# Patient Record
Sex: MALE | Race: WHITE | NOT HISPANIC OR LATINO
[De-identification: names, ages, dates, MRNs, and addresses within clinical notes are randomized per-mention and may not be internally consistent; named-entity substitution may affect disease eponyms.]

---

## 2017-02-16 ENCOUNTER — APPOINTMENT (OUTPATIENT)
Dept: INTERNAL MEDICINE | Facility: CLINIC | Age: 67
End: 2017-02-16

## 2017-05-05 ENCOUNTER — APPOINTMENT (OUTPATIENT)
Dept: INTERNAL MEDICINE | Facility: CLINIC | Age: 67
End: 2017-05-05

## 2017-05-05 VITALS
HEIGHT: 69 IN | WEIGHT: 172 LBS | SYSTOLIC BLOOD PRESSURE: 220 MMHG | OXYGEN SATURATION: 98 % | HEART RATE: 100 BPM | BODY MASS INDEX: 25.48 KG/M2 | TEMPERATURE: 98.2 F | DIASTOLIC BLOOD PRESSURE: 90 MMHG

## 2017-05-05 VITALS — SYSTOLIC BLOOD PRESSURE: 150 MMHG | DIASTOLIC BLOOD PRESSURE: 80 MMHG

## 2017-06-06 ENCOUNTER — APPOINTMENT (OUTPATIENT)
Dept: INTERNAL MEDICINE | Facility: CLINIC | Age: 67
End: 2017-06-06

## 2017-06-06 VITALS
HEART RATE: 89 BPM | HEIGHT: 69 IN | WEIGHT: 170 LBS | DIASTOLIC BLOOD PRESSURE: 80 MMHG | OXYGEN SATURATION: 98 % | TEMPERATURE: 98.5 F | BODY MASS INDEX: 25.18 KG/M2 | SYSTOLIC BLOOD PRESSURE: 156 MMHG

## 2017-07-12 ENCOUNTER — APPOINTMENT (OUTPATIENT)
Dept: OTOLARYNGOLOGY | Facility: CLINIC | Age: 67
End: 2017-07-12

## 2018-01-11 ENCOUNTER — TRANSCRIPTION ENCOUNTER (OUTPATIENT)
Age: 68
End: 2018-01-11

## 2018-06-12 ENCOUNTER — APPOINTMENT (OUTPATIENT)
Dept: INTERNAL MEDICINE | Facility: CLINIC | Age: 68
End: 2018-06-12
Payer: MEDICARE

## 2018-06-12 VITALS
WEIGHT: 170 LBS | SYSTOLIC BLOOD PRESSURE: 184 MMHG | BODY MASS INDEX: 25.18 KG/M2 | HEART RATE: 66 BPM | HEIGHT: 69 IN | TEMPERATURE: 98.5 F | DIASTOLIC BLOOD PRESSURE: 68 MMHG | OXYGEN SATURATION: 100 %

## 2018-06-12 VITALS — SYSTOLIC BLOOD PRESSURE: 130 MMHG | DIASTOLIC BLOOD PRESSURE: 80 MMHG

## 2018-06-12 PROCEDURE — 99213 OFFICE O/P EST LOW 20 MIN: CPT

## 2018-06-12 NOTE — PHYSICAL EXAM

## 2018-06-13 NOTE — HISTORY OF PRESENT ILLNESS
[FreeTextEntry1] : No chief complaint [de-identified] : No chief complaint: Urine noted with blood;  Needs follow up with urology

## 2018-06-13 NOTE — ASSESSMENT
[FreeTextEntry1] : Patient exam is within normal limits; Will refer patient to another urologist since his present urologist has changed location; Provably needs cysto gain. Will contact

## 2019-05-01 DIAGNOSIS — Z00.00 ENCOUNTER FOR GENERAL ADULT MEDICAL EXAMINATION W/OUT ABNORMAL FINDINGS: ICD-10-CM

## 2019-08-05 ENCOUNTER — APPOINTMENT (OUTPATIENT)
Dept: INTERNAL MEDICINE | Facility: CLINIC | Age: 69
End: 2019-08-05
Payer: MEDICARE

## 2019-08-05 VITALS
OXYGEN SATURATION: 100 % | DIASTOLIC BLOOD PRESSURE: 86 MMHG | SYSTOLIC BLOOD PRESSURE: 161 MMHG | HEIGHT: 69 IN | WEIGHT: 167.31 LBS | TEMPERATURE: 97.8 F | HEART RATE: 89 BPM | BODY MASS INDEX: 24.78 KG/M2

## 2019-08-05 PROCEDURE — 99213 OFFICE O/P EST LOW 20 MIN: CPT

## 2019-08-05 RX ORDER — TAMSULOSIN HCL 0.4 MG
0.4 CAPSULE ORAL
Refills: 0 | Status: ACTIVE | COMMUNITY

## 2019-08-05 NOTE — PHYSICAL EXAM
[No Acute Distress] : no acute distress [Well Developed] : well developed [Well Nourished] : well nourished [Well-Appearing] : well-appearing [Normal Sclera/Conjunctiva] : normal sclera/conjunctiva [PERRL] : pupils equal round and reactive to light [EOMI] : extraocular movements intact [Normal Outer Ear/Nose] : the outer ears and nose were normal in appearance [Normal Oropharynx] : the oropharynx was normal [No JVD] : no jugular venous distention [Supple] : supple [No Lymphadenopathy] : no lymphadenopathy [Thyroid Normal, No Nodules] : the thyroid was normal and there were no nodules present [No Respiratory Distress] : no respiratory distress  [Clear to Auscultation] : lungs were clear to auscultation bilaterally [No Accessory Muscle Use] : no accessory muscle use [Normal Rate] : normal rate  [Regular Rhythm] : with a regular rhythm [Normal S1, S2] : normal S1 and S2 [No Murmur] : no murmur heard [No Carotid Bruits] : no carotid bruits [No Abdominal Bruit] : a ~M bruit was not heard ~T in the abdomen [No Edema] : there was no peripheral edema [Pedal Pulses Present] : the pedal pulses are present [No Varicosities] : no varicosities [No Extremity Clubbing/Cyanosis] : no extremity clubbing/cyanosis [No Palpable Aorta] : no palpable aorta [Non Tender] : non-tender [Soft] : abdomen soft [No Masses] : no abdominal mass palpated [Non-distended] : non-distended [No HSM] : no HSM [Normal Posterior Cervical Nodes] : no posterior cervical lymphadenopathy [Normal Bowel Sounds] : normal bowel sounds [Normal Anterior Cervical Nodes] : no anterior cervical lymphadenopathy [No CVA Tenderness] : no CVA  tenderness [Grossly Normal Strength/Tone] : grossly normal strength/tone [No Joint Swelling] : no joint swelling [No Spinal Tenderness] : no spinal tenderness [No Rash] : no rash [Coordination Grossly Intact] : coordination grossly intact [No Focal Deficits] : no focal deficits [Deep Tendon Reflexes (DTR)] : deep tendon reflexes were 2+ and symmetric [Normal Gait] : normal gait [Normal Affect] : the affect was normal [Normal Insight/Judgement] : insight and judgment were intact

## 2020-08-19 ENCOUNTER — APPOINTMENT (OUTPATIENT)
Dept: INTERNAL MEDICINE | Facility: CLINIC | Age: 70
End: 2020-08-19

## 2020-09-08 ENCOUNTER — APPOINTMENT (OUTPATIENT)
Dept: INTERNAL MEDICINE | Facility: CLINIC | Age: 70
End: 2020-09-08
Payer: MEDICARE

## 2020-09-08 VITALS — DIASTOLIC BLOOD PRESSURE: 80 MMHG | SYSTOLIC BLOOD PRESSURE: 130 MMHG

## 2020-09-08 VITALS — SYSTOLIC BLOOD PRESSURE: 130 MMHG | DIASTOLIC BLOOD PRESSURE: 80 MMHG

## 2020-09-08 PROCEDURE — 90662 IIV NO PRSV INCREASED AG IM: CPT

## 2020-09-08 PROCEDURE — G0008: CPT

## 2020-09-08 PROCEDURE — 93000 ELECTROCARDIOGRAM COMPLETE: CPT

## 2020-09-08 PROCEDURE — 99213 OFFICE O/P EST LOW 20 MIN: CPT | Mod: 25

## 2020-09-10 NOTE — HISTORY OF PRESENT ILLNESS
[FreeTextEntry1] : Labs  noted;  [de-identified] : Urine excellent ; Only one time a night urine;

## 2020-09-10 NOTE — HISTORY OF PRESENT ILLNESS
[FreeTextEntry1] : Labs  noted;  [de-identified] : Urine excellent ; Only one time a night urine;

## 2020-11-02 ENCOUNTER — APPOINTMENT (OUTPATIENT)
Dept: DERMATOLOGY | Facility: CLINIC | Age: 70
End: 2020-11-02

## 2021-09-10 ENCOUNTER — APPOINTMENT (OUTPATIENT)
Dept: INTERNAL MEDICINE | Facility: CLINIC | Age: 71
End: 2021-09-10
Payer: MEDICARE

## 2021-09-10 VITALS
RESPIRATION RATE: 16 BRPM | DIASTOLIC BLOOD PRESSURE: 80 MMHG | SYSTOLIC BLOOD PRESSURE: 171 MMHG | BODY MASS INDEX: 24.88 KG/M2 | OXYGEN SATURATION: 98 % | HEIGHT: 69 IN | TEMPERATURE: 98.2 F | HEART RATE: 84 BPM | WEIGHT: 168 LBS

## 2021-09-10 VITALS — SYSTOLIC BLOOD PRESSURE: 130 MMHG | DIASTOLIC BLOOD PRESSURE: 80 MMHG

## 2021-09-10 DIAGNOSIS — M25.473 EFFUSION, UNSPECIFIED ANKLE: ICD-10-CM

## 2021-09-10 PROCEDURE — 99213 OFFICE O/P EST LOW 20 MIN: CPT

## 2021-10-15 ENCOUNTER — RX RENEWAL (OUTPATIENT)
Age: 71
End: 2021-10-15

## 2021-12-15 ENCOUNTER — RX CHANGE (OUTPATIENT)
Age: 71
End: 2021-12-15

## 2021-12-15 RX ORDER — AMLODIPINE BESYLATE 10 MG/1
10 TABLET ORAL DAILY
Qty: 30 | Refills: 5 | Status: DISCONTINUED | COMMUNITY
Start: 2017-05-05 | End: 2021-12-15

## 2021-12-15 RX ORDER — TAMSULOSIN HYDROCHLORIDE 0.4 MG/1
0.4 CAPSULE ORAL
Qty: 30 | Refills: 11 | Status: DISCONTINUED | COMMUNITY
Start: 2020-09-29 | End: 2021-12-15

## 2022-03-24 ENCOUNTER — APPOINTMENT (OUTPATIENT)
Dept: INTERNAL MEDICINE | Facility: CLINIC | Age: 72
End: 2022-03-24
Payer: MEDICARE

## 2022-03-24 VITALS
WEIGHT: 170 LBS | HEIGHT: 69 IN | SYSTOLIC BLOOD PRESSURE: 158 MMHG | HEART RATE: 87 BPM | OXYGEN SATURATION: 98 % | RESPIRATION RATE: 14 BRPM | TEMPERATURE: 98.7 F | DIASTOLIC BLOOD PRESSURE: 82 MMHG | BODY MASS INDEX: 25.18 KG/M2

## 2022-03-24 DIAGNOSIS — Z92.29 PERSONAL HISTORY OF OTHER DRUG THERAPY: ICD-10-CM

## 2022-03-24 DIAGNOSIS — R31.9 HEMATURIA, UNSPECIFIED: ICD-10-CM

## 2022-03-24 PROCEDURE — 99213 OFFICE O/P EST LOW 20 MIN: CPT

## 2022-03-24 NOTE — HISTORY OF PRESENT ILLNESS
[FreeTextEntry1] : no chief comlaint\par Chest film clear;\par [employer] follow up [de-identified] : Exercise in house\par Lipids increased\par Should be repeated

## 2022-06-23 ENCOUNTER — APPOINTMENT (OUTPATIENT)
Dept: INTERNAL MEDICINE | Facility: CLINIC | Age: 72
End: 2022-06-23
Payer: MEDICARE

## 2022-06-23 VITALS
SYSTOLIC BLOOD PRESSURE: 164 MMHG | TEMPERATURE: 97.8 F | DIASTOLIC BLOOD PRESSURE: 94 MMHG | OXYGEN SATURATION: 98 % | BODY MASS INDEX: 25.18 KG/M2 | HEART RATE: 107 BPM | WEIGHT: 170 LBS | HEIGHT: 69 IN

## 2022-06-23 PROCEDURE — 99213 OFFICE O/P EST LOW 20 MIN: CPT

## 2022-06-23 NOTE — HEALTH RISK ASSESSMENT
[Never] : Never [No] : No [No falls in past year] : Patient reported no falls in the past year [0] : 2) Feeling down, depressed, or hopeless: Not at all (0) [[id number]] : 0

## 2022-10-06 ENCOUNTER — APPOINTMENT (OUTPATIENT)
Dept: INTERNAL MEDICINE | Facility: CLINIC | Age: 72
End: 2022-10-06

## 2022-10-06 VITALS — DIASTOLIC BLOOD PRESSURE: 80 MMHG | SYSTOLIC BLOOD PRESSURE: 130 MMHG

## 2022-10-06 VITALS
DIASTOLIC BLOOD PRESSURE: 86 MMHG | HEIGHT: 69 IN | HEART RATE: 99 BPM | SYSTOLIC BLOOD PRESSURE: 155 MMHG | OXYGEN SATURATION: 98 % | WEIGHT: 170 LBS | BODY MASS INDEX: 25.18 KG/M2 | TEMPERATURE: 97.8 F

## 2022-10-06 DIAGNOSIS — N20.0 CALCULUS OF KIDNEY: ICD-10-CM

## 2022-10-06 DIAGNOSIS — K21.9 GASTRO-ESOPHAGEAL REFLUX DISEASE W/OUT ESOPHAGITIS: ICD-10-CM

## 2022-10-06 PROCEDURE — 99213 OFFICE O/P EST LOW 20 MIN: CPT

## 2022-10-06 NOTE — HEALTH RISK ASSESSMENT
[Never] : Never [No] : No [No falls in past year] : Patient reported no falls in the past year [0] : 2) Feeling down, depressed, or hopeless: Not at all (0) [[id number]] : 0

## 2022-10-08 ENCOUNTER — RX RENEWAL (OUTPATIENT)
Age: 72
End: 2022-10-08

## 2022-12-14 RX ORDER — NIRMATRELVIR AND RITONAVIR 300-100 MG
20 X 150 MG & KIT ORAL
Qty: 1 | Refills: 0 | Status: ACTIVE | COMMUNITY
Start: 2022-12-14 | End: 1900-01-01

## 2022-12-19 ENCOUNTER — RX RENEWAL (OUTPATIENT)
Age: 72
End: 2022-12-19

## 2022-12-20 ENCOUNTER — RX RENEWAL (OUTPATIENT)
Age: 72
End: 2022-12-20

## 2023-01-04 ENCOUNTER — RX RENEWAL (OUTPATIENT)
Age: 73
End: 2023-01-04

## 2023-01-05 ENCOUNTER — APPOINTMENT (OUTPATIENT)
Dept: INTERNAL MEDICINE | Facility: CLINIC | Age: 73
End: 2023-01-05

## 2023-01-05 RX ORDER — MECLIZINE HYDROCHLORIDE 12.5 MG/1
12.5 TABLET ORAL 3 TIMES DAILY
Qty: 30 | Refills: 1 | Status: COMPLETED | COMMUNITY
Start: 2017-12-12 | End: 2019-08-25

## 2023-01-05 RX ORDER — AZITHROMYCIN 250 MG/1
250 TABLET, FILM COATED ORAL
Qty: 6 | Refills: 1 | Status: COMPLETED | COMMUNITY
Start: 2022-10-13 | End: 2022-10-13

## 2023-03-02 ENCOUNTER — RX RENEWAL (OUTPATIENT)
Age: 73
End: 2023-03-02

## 2023-03-02 ENCOUNTER — APPOINTMENT (OUTPATIENT)
Dept: INTERNAL MEDICINE | Facility: CLINIC | Age: 73
End: 2023-03-02
Payer: MEDICARE

## 2023-03-02 VITALS
TEMPERATURE: 97.8 F | BODY MASS INDEX: 25.18 KG/M2 | WEIGHT: 170 LBS | HEIGHT: 69 IN | HEART RATE: 91 BPM | DIASTOLIC BLOOD PRESSURE: 76 MMHG | SYSTOLIC BLOOD PRESSURE: 155 MMHG | OXYGEN SATURATION: 98 %

## 2023-03-02 VITALS — SYSTOLIC BLOOD PRESSURE: 140 MMHG | DIASTOLIC BLOOD PRESSURE: 50 MMHG

## 2023-03-02 PROCEDURE — 99213 OFFICE O/P EST LOW 20 MIN: CPT

## 2023-03-02 RX ORDER — TAMSULOSIN HYDROCHLORIDE 0.4 MG/1
0.4 CAPSULE ORAL
Qty: 90 | Refills: 3 | Status: ACTIVE | COMMUNITY
Start: 2021-12-15 | End: 1900-01-01

## 2023-03-02 NOTE — ASSESSMENT
[FreeTextEntry1] : will need neuro appointment\par Rule out Parkinson disease \par All labs \par Increase Flomax to 0,8 at night

## 2023-03-02 NOTE — PHYSICAL EXAM
[No Acute Distress] : no acute distress [Well Nourished] : well nourished [Well Developed] : well developed [Well-Appearing] : well-appearing [Normal Sclera/Conjunctiva] : normal sclera/conjunctiva [PERRL] : pupils equal round and reactive to light [EOMI] : extraocular movements intact [Normal Outer Ear/Nose] : the outer ears and nose were normal in appearance [Normal Oropharynx] : the oropharynx was normal [No JVD] : no jugular venous distention [No Lymphadenopathy] : no lymphadenopathy [Supple] : supple [Thyroid Normal, No Nodules] : the thyroid was normal and there were no nodules present [No Respiratory Distress] : no respiratory distress  [No Accessory Muscle Use] : no accessory muscle use [Clear to Auscultation] : lungs were clear to auscultation bilaterally [Normal Rate] : normal rate  [Regular Rhythm] : with a regular rhythm [Normal S1, S2] : normal S1 and S2 [No Murmur] : no murmur heard [No Carotid Bruits] : no carotid bruits [No Abdominal Bruit] : a ~M bruit was not heard ~T in the abdomen [No Varicosities] : no varicosities [Pedal Pulses Present] : the pedal pulses are present [No Edema] : there was no peripheral edema [No Palpable Aorta] : no palpable aorta [No Extremity Clubbing/Cyanosis] : no extremity clubbing/cyanosis [Soft] : abdomen soft [Non Tender] : non-tender [Non-distended] : non-distended [No Masses] : no abdominal mass palpated [No HSM] : no HSM [Normal Bowel Sounds] : normal bowel sounds [Normal Posterior Cervical Nodes] : no posterior cervical lymphadenopathy [Normal Anterior Cervical Nodes] : no anterior cervical lymphadenopathy [No CVA Tenderness] : no CVA  tenderness [No Spinal Tenderness] : no spinal tenderness [No Joint Swelling] : no joint swelling [Grossly Normal Strength/Tone] : grossly normal strength/tone [No Rash] : no rash [Coordination Grossly Intact] : coordination grossly intact [No Focal Deficits] : no focal deficits [Normal Gait] : normal gait [Deep Tendon Reflexes (DTR)] : deep tendon reflexes were 2+ and symmetric [Normal Affect] : the affect was normal [Normal Insight/Judgement] : insight and judgment were intact [de-identified] : Abnormal gait ;  Balance oproblerms

## 2023-03-02 NOTE — HISTORY OF PRESENT ILLNESS
[FreeTextEntry1] : Chief complaint feet shuffle when he walks \par No problems with arms  \par No weakness of arms [de-identified] : Also has frequent urination\par Also balance off

## 2023-03-03 ENCOUNTER — NON-APPOINTMENT (OUTPATIENT)
Age: 73
End: 2023-03-03

## 2023-03-03 LAB
25(OH)D3 SERPL-MCNC: 34.4 NG/ML
ALBUMIN SERPL ELPH-MCNC: 4.8 G/DL
ALP BLD-CCNC: 70 U/L
ALT SERPL-CCNC: 33 U/L
ANION GAP SERPL CALC-SCNC: 15 MMOL/L
APPEARANCE: CLEAR
AST SERPL-CCNC: 24 U/L
BACTERIA: NEGATIVE
BASOPHILS # BLD AUTO: 0.06 K/UL
BASOPHILS NFR BLD AUTO: 0.7 %
BILIRUB SERPL-MCNC: 0.8 MG/DL
BILIRUBIN URINE: NEGATIVE
BLOOD URINE: ABNORMAL
BUN SERPL-MCNC: 22 MG/DL
CALCIUM SERPL-MCNC: 9.9 MG/DL
CHLORIDE SERPL-SCNC: 103 MMOL/L
CHOLEST SERPL-MCNC: 202 MG/DL
CO2 SERPL-SCNC: 23 MMOL/L
COLOR: NORMAL
CREAT SERPL-MCNC: 0.8 MG/DL
EGFR: 93 ML/MIN/1.73M2
EOSINOPHIL # BLD AUTO: 0.04 K/UL
EOSINOPHIL NFR BLD AUTO: 0.4 %
ESTIMATED AVERAGE GLUCOSE: 114 MG/DL
GLUCOSE QUALITATIVE U: NEGATIVE
GLUCOSE SERPL-MCNC: 95 MG/DL
HBA1C MFR BLD HPLC: 5.6 %
HCT VFR BLD CALC: 47.6 %
HDLC SERPL-MCNC: 67 MG/DL
HGB BLD-MCNC: 15.7 G/DL
HYALINE CASTS: 0 /LPF
IMM GRANULOCYTES NFR BLD AUTO: 0.4 %
KETONES URINE: NORMAL
LDLC SERPL CALC-MCNC: 124 MG/DL
LEUKOCYTE ESTERASE URINE: NEGATIVE
LYMPHOCYTES # BLD AUTO: 2.23 K/UL
LYMPHOCYTES NFR BLD AUTO: 24.4 %
MAN DIFF?: NORMAL
MCHC RBC-ENTMCNC: 30.8 PG
MCHC RBC-ENTMCNC: 33 GM/DL
MCV RBC AUTO: 93.5 FL
MICROSCOPIC-UA: NORMAL
MONOCYTES # BLD AUTO: 0.67 K/UL
MONOCYTES NFR BLD AUTO: 7.3 %
NEUTROPHILS # BLD AUTO: 6.11 K/UL
NEUTROPHILS NFR BLD AUTO: 66.8 %
NITRITE URINE: NEGATIVE
NONHDLC SERPL-MCNC: 135 MG/DL
PH URINE: 5.5
PLATELET # BLD AUTO: 364 K/UL
POTASSIUM SERPL-SCNC: 4.3 MMOL/L
PROT SERPL-MCNC: 7.4 G/DL
PROTEIN URINE: NEGATIVE
PSA SERPL-MCNC: 6.44 NG/ML
RBC # BLD: 5.09 M/UL
RBC # FLD: 13.1 %
RED BLOOD CELLS URINE: 3 /HPF
SODIUM SERPL-SCNC: 141 MMOL/L
SPECIFIC GRAVITY URINE: 1.01
SQUAMOUS EPITHELIAL CELLS: 0 /HPF
T4 FREE SERPL-MCNC: 1.3 NG/DL
TRIGL SERPL-MCNC: 53 MG/DL
TSH SERPL-ACNC: 0.88 UIU/ML
UROBILINOGEN URINE: NORMAL
WBC # FLD AUTO: 9.15 K/UL
WHITE BLOOD CELLS URINE: 0 /HPF

## 2023-03-07 ENCOUNTER — APPOINTMENT (OUTPATIENT)
Dept: NEUROLOGY | Facility: CLINIC | Age: 73
End: 2023-03-07
Payer: MEDICARE

## 2023-03-07 VITALS
HEART RATE: 103 BPM | SYSTOLIC BLOOD PRESSURE: 182 MMHG | WEIGHT: 165 LBS | OXYGEN SATURATION: 98 % | BODY MASS INDEX: 24.44 KG/M2 | DIASTOLIC BLOOD PRESSURE: 111 MMHG | HEIGHT: 69 IN | TEMPERATURE: 97.9 F

## 2023-03-07 LAB
COVID-19 NUCLEOCAPSID  GAM ANTIBODY INTERPRETATION: POSITIVE
COVID-19 SPIKE DOMAIN ANTIBODY INTERPRETATION: POSITIVE
SARS-COV-2 AB SERPL IA-ACNC: >250 U/ML
SARS-COV-2 AB SERPL QL IA: 130 INDEX

## 2023-03-07 PROCEDURE — 99204 OFFICE O/P NEW MOD 45 MIN: CPT

## 2023-03-07 NOTE — HISTORY OF PRESENT ILLNESS
[FreeTextEntry1] : Referred by Dr. Murray for gait impairment\par Symptoms started a couple of months ago, described as "skating" on the floor - doesn't lift up feet\par He also has low back pain, worse when standing up after sitting down for a while, associated with stiffness\par He denies tremors, dysarthria, dysphagia, constipation. \par \par Reviewed:\par Notes from internal medicine\par Labs below

## 2023-03-07 NOTE — PHYSICAL EXAM
[FreeTextEntry1] : Gen: appears well, well-nourished, no acute distress\par \par MS: awake, alert, oriented, speech fluent, comprehension intact, good fund of knowledge, recent and remote memory intact, attention intact\par \par CN: PERRL, EOMI, visual fields full, facial strength and sensation intact and symmetric, palate elevation symmetric, tongue midline, no tongue atrophy or fasciculations\par \par Motor: mild cogwheeling at right wrist with activation; 5/5 strength throughout, no resting or postural tremor \par \par Sensory: vibration - absent at toes, mod red at ankles and knees; JPS intact at toes b/l\par \par Reflexes: slightly brisk 3+ symmetric throughout, no Gomez’s sign, plantar responses flexor bilaterally\par \par Coordination: no dysmetria on finger to nose, Romberg positive initially, second try negative with some swaying \par \par Gait: poor ground clearance, moderate stance, possible mild shuffling, no freezing, turns well; cannot tandem

## 2023-03-07 NOTE — ASSESSMENT
[FreeTextEntry1] : Gait is mildly parkinsonian and there may be slight cogwheeling at the right wrist; however he has diffuse hyperreflexia raising suspicion for cervical myelopathy\par Will get MRI C spine and brain (to r/o NPH, frontal lobe lesion, cerebrovascular disease / vascular parkinsonism)\par Also check B12, folate, copper \par If this workup is unremarkable consider trial of levodopa vs. OUSMANE scan\par \par

## 2023-03-07 NOTE — CONSULT LETTER
[Dear  ___] : Dear  [unfilled], [Consult Letter:] : I had the pleasure of evaluating your patient, [unfilled]. [Please see my note below.] : Please see my note below. [Consult Closing:] : Thank you very much for allowing me to participate in the care of this patient.  If you have any questions, please do not hesitate to contact me. [Sincerely,] : Sincerely, [FreeTextEntry3] : Taras Salinas M.D.\par Neurology, Electromyography and Neuromuscular Medicine\par [facility]\par \par  of Neurology\par [facility] [employer]

## 2023-03-13 ENCOUNTER — APPOINTMENT (OUTPATIENT)
Dept: MRI IMAGING | Facility: CLINIC | Age: 73
End: 2023-03-13
Payer: MEDICARE

## 2023-03-13 ENCOUNTER — NON-APPOINTMENT (OUTPATIENT)
Age: 73
End: 2023-03-13

## 2023-03-13 LAB
ALBUMIN MFR SERPL ELPH: 60.3 %
ALBUMIN SERPL-MCNC: 4.6 G/DL
ALBUMIN/GLOB SERPL: 1.5 RATIO
ALPHA1 GLOB MFR SERPL ELPH: 3.5 %
ALPHA1 GLOB SERPL ELPH-MCNC: 0.3 G/DL
ALPHA2 GLOB MFR SERPL ELPH: 9.3 %
ALPHA2 GLOB SERPL ELPH-MCNC: 0.7 G/DL
B-GLOBULIN MFR SERPL ELPH: 10.9 %
B-GLOBULIN SERPL ELPH-MCNC: 0.8 G/DL
C22:0: 73.3 NMOL/ML
C24:0/C22:0: 0.87 RATIO
C24:0: 63.8 NMOL/ML
C26:0/C22:0: 0.01 RATIO
C26:0: 0.5 NMOL/ML
COMMENT: NORMAL
COPPER SERPL-MCNC: 92 UG/DL
DEPRECATED KAPPA LC FREE/LAMBDA SER: 1.47 RATIO
FOLATE SERPL-MCNC: >20 NG/ML
GAMMA GLOB FLD ELPH-MCNC: 1.2 G/DL
GAMMA GLOB MFR SERPL ELPH: 16 %
HOMOCYSTEINE LEVEL: 9.2 UMOL/L
HTLV I+II AB SER QL: NORMAL
IGA SER QL IEP: 243 MG/DL
IGG SER QL IEP: 1213 MG/DL
IGM SER QL IEP: 67 MG/DL
INTERPRETATION SERPL IEP-IMP: NORMAL
KAPPA LC CSF-MCNC: 1.52 MG/DL
KAPPA LC SERPL-MCNC: 2.24 MG/DL
M PROTEIN SPEC IFE-MCNC: NORMAL
METHYLMALONIC ACID LEVEL: 112 NMOL/L
PHYTANIC ACID: 1.54 NMOL/ML
PRISTANIC ACID: 0.1 NMOL/ML
PRISTANIC/ PHYTANIC: 0.06 RATIO
PROT SERPL-MCNC: 7.6 G/DL
PROT SERPL-MCNC: 7.6 G/DL
VIT B12 SERPL-MCNC: 916 PG/ML

## 2023-03-13 PROCEDURE — 72141 MRI NECK SPINE W/O DYE: CPT | Mod: MH

## 2023-03-13 PROCEDURE — 70551 MRI BRAIN STEM W/O DYE: CPT | Mod: MH

## 2023-03-14 ENCOUNTER — APPOINTMENT (OUTPATIENT)
Dept: UROLOGY | Facility: CLINIC | Age: 73
End: 2023-03-14
Payer: MEDICARE

## 2023-03-14 VITALS
BODY MASS INDEX: 21.48 KG/M2 | TEMPERATURE: 97.9 F | HEART RATE: 102 BPM | DIASTOLIC BLOOD PRESSURE: 72 MMHG | SYSTOLIC BLOOD PRESSURE: 165 MMHG | OXYGEN SATURATION: 96 % | HEIGHT: 69 IN | WEIGHT: 145 LBS

## 2023-03-14 LAB
BILIRUB UR QL STRIP: NORMAL
CLARITY UR: CLEAR
COLLECTION METHOD: NORMAL
GLUCOSE UR-MCNC: NORMAL
HCG UR QL: 0.2 EU/DL
HGB UR QL STRIP.AUTO: ABNORMAL
KETONES UR-MCNC: NORMAL
LEUKOCYTE ESTERASE UR QL STRIP: NORMAL
NITRITE UR QL STRIP: NORMAL
PH UR STRIP: 5.5
PROT UR STRIP-MCNC: NORMAL
SP GR UR STRIP: 1.02

## 2023-03-14 PROCEDURE — 81003 URINALYSIS AUTO W/O SCOPE: CPT | Mod: QW

## 2023-03-14 PROCEDURE — 51798 US URINE CAPACITY MEASURE: CPT

## 2023-03-14 NOTE — HISTORY OF PRESENT ILLNESS
[FreeTextEntry1] : 73 yo male referred for elevated PSA and LUTS.  He has a hx of fluctuating PSA and per the patient is s/p TRUS biopsy several years ago which was negative.  He may have also had another biopsy at some point after that which was also negative,  He has never had a prostate MRI due to claustrophobia.  His most recent PSA was from 3/2/23 and was 6.44.  \par \par He also complains of frequency and urgency.  He is on tamsulosin which helps, but he is mainly bothered by urgency and frequency.  He denies dysuria or hematuria. \par \par \par Of note, he has a hx of stones and is s/p PCNL several years ago with Dr. Huffman. \par \par PVR = 0 cc (LUTS)\par IPSS = 12; QoL = 3

## 2023-03-14 NOTE — PHYSICAL EXAM
[General Appearance - Well Developed] : well developed [Normal Appearance] : normal appearance [Heart Rate And Rhythm] : Heart rate and rhythm were normal [] : no respiratory distress [Abdomen Soft] : soft [Abdomen Tenderness] : non-tender [Abdomen Hernia] : no hernia was discovered [Costovertebral Angle Tenderness] : no ~M costovertebral angle tenderness [Urethral Meatus] : meatus normal [Epididymis] : the epididymides were normal [Penis Abnormality] : normal circumcised penis [Testes Tenderness] : no tenderness of the testes [Testes Mass (___cm)] : there were no testicular masses [Prostate Tenderness] : the prostate was not tender [No Prostate Nodules] : no prostate nodules [Prostate Size ___ (0-4)] : prostate size [unfilled] (scale: 0-4) [Normal Station and Gait] : the gait and station were normal for the patient's age [Skin Color & Pigmentation] : normal skin color and pigmentation [No Focal Deficits] : no focal deficits [Oriented To Time, Place, And Person] : oriented to person, place, and time

## 2023-03-14 NOTE — ASSESSMENT
[FreeTextEntry1] : 71 yo male with LUTS and elevated PSA.\par \par The patient's records were reviewed and discussed. The differential diagnosis of an elevated PSA (prostate specific antigen) level was discussed including prostate enlargement, prostate inflammation or infection, and prostate cancer. Options were provided for further evaluation including, but not limited to, serial PSA and digital rectal exam, PCA3, prostate MRI, and prostate biopsy. Newer tests including PHI (prostate health index) and 4Kscore tests were discussed. The merits and limitations as well as adverse effects associated with each option was provided.\par \par It is unclear when and how many prostate biopsies he has had.  Iw ill obtain more information if possible.  I would like  him to get a prostate MRI, but he insists that he is unable to do so, even with anxiolytics due to severe claustrophobia.  \par \par We also discussed his LUTS.  He will continue to take tamsulosin.  We also discussed adding a beta-3 agonist or anticholinergic for urgency and frequency.  We reviewed potential side effects.  He would like to try Myrbetriq 50 mg.  \par \par Plan:\par 1. Will obtain more info on prior prostate biopsies\par 2. Cont tamsulosin\par 3. Rx for Myrbetriq 50 mg daily\par 4. RTC 1 month

## 2023-03-16 ENCOUNTER — NON-APPOINTMENT (OUTPATIENT)
Age: 73
End: 2023-03-16

## 2023-03-17 ENCOUNTER — NON-APPOINTMENT (OUTPATIENT)
Age: 73
End: 2023-03-17

## 2023-03-31 DIAGNOSIS — G20 PARKINSON'S DISEASE: ICD-10-CM

## 2023-04-05 ENCOUNTER — RX RENEWAL (OUTPATIENT)
Age: 73
End: 2023-04-05

## 2023-04-20 ENCOUNTER — OUTPATIENT (OUTPATIENT)
Dept: OUTPATIENT SERVICES | Facility: HOSPITAL | Age: 73
LOS: 1 days | End: 2023-04-20
Payer: MEDICARE

## 2023-04-20 ENCOUNTER — APPOINTMENT (OUTPATIENT)
Dept: NUCLEAR MEDICINE | Facility: HOSPITAL | Age: 73
End: 2023-04-20
Payer: MEDICARE

## 2023-04-20 PROCEDURE — 78803 RP LOCLZJ TUM SPECT 1 AREA: CPT

## 2023-04-20 PROCEDURE — 78803 RP LOCLZJ TUM SPECT 1 AREA: CPT | Mod: 26,MH

## 2023-04-20 PROCEDURE — A9584: CPT

## 2023-04-25 ENCOUNTER — NON-APPOINTMENT (OUTPATIENT)
Age: 73
End: 2023-04-25

## 2023-04-26 ENCOUNTER — APPOINTMENT (OUTPATIENT)
Dept: OTOLARYNGOLOGY | Facility: CLINIC | Age: 73
End: 2023-04-26
Payer: MEDICARE

## 2023-04-26 VITALS
WEIGHT: 168 LBS | SYSTOLIC BLOOD PRESSURE: 130 MMHG | HEIGHT: 69 IN | BODY MASS INDEX: 24.88 KG/M2 | DIASTOLIC BLOOD PRESSURE: 80 MMHG | HEART RATE: 90 BPM

## 2023-04-26 DIAGNOSIS — R26.89 OTHER ABNORMALITIES OF GAIT AND MOBILITY: ICD-10-CM

## 2023-04-26 DIAGNOSIS — H61.23 IMPACTED CERUMEN, BILATERAL: ICD-10-CM

## 2023-04-26 DIAGNOSIS — U07.1 COVID-19: ICD-10-CM

## 2023-04-26 DIAGNOSIS — Z78.9 OTHER SPECIFIED HEALTH STATUS: ICD-10-CM

## 2023-04-26 DIAGNOSIS — H61.21 IMPACTED CERUMEN, RIGHT EAR: ICD-10-CM

## 2023-04-26 DIAGNOSIS — Z86.79 PERSONAL HISTORY OF OTHER DISEASES OF THE CIRCULATORY SYSTEM: ICD-10-CM

## 2023-04-26 DIAGNOSIS — Z82.49 FAMILY HISTORY OF ISCHEMIC HEART DISEASE AND OTHER DISEASES OF THE CIRCULATORY SYSTEM: ICD-10-CM

## 2023-04-26 DIAGNOSIS — Z86.69 PERSONAL HISTORY OF OTHER DISEASES OF THE NERVOUS SYSTEM AND SENSE ORGANS: ICD-10-CM

## 2023-04-26 DIAGNOSIS — H90.3 SENSORINEURAL HEARING LOSS, BILATERAL: ICD-10-CM

## 2023-04-26 PROCEDURE — 99203 OFFICE O/P NEW LOW 30 MIN: CPT

## 2023-04-26 PROCEDURE — 92567 TYMPANOMETRY: CPT

## 2023-04-26 PROCEDURE — 92557 COMPREHENSIVE HEARING TEST: CPT

## 2023-04-26 RX ORDER — SODIUM CHLORIDE 0.65 %
0.65 AEROSOL, SPRAY (ML) NASAL
Qty: 50 | Refills: 0 | Status: COMPLETED | COMMUNITY
Start: 2021-08-09 | End: 2023-04-26

## 2023-04-26 RX ORDER — SOLIFENACIN SUCCINATE 5 MG/1
5 TABLET ORAL
Qty: 30 | Refills: 2 | Status: COMPLETED | COMMUNITY
Start: 2023-03-16 | End: 2023-04-26

## 2023-04-26 RX ORDER — MIRABEGRON 50 MG/1
50 TABLET, FILM COATED, EXTENDED RELEASE ORAL
Qty: 30 | Refills: 3 | Status: COMPLETED | COMMUNITY
Start: 2023-03-14 | End: 2023-04-26

## 2023-05-02 PROBLEM — G95.9 MYELOPATHY: Status: ACTIVE | Noted: 2023-03-07

## 2023-05-05 ENCOUNTER — APPOINTMENT (OUTPATIENT)
Dept: NEUROSURGERY | Facility: CLINIC | Age: 73
End: 2023-05-05
Payer: MEDICARE

## 2023-05-05 ENCOUNTER — NON-APPOINTMENT (OUTPATIENT)
Age: 73
End: 2023-05-05

## 2023-05-05 VITALS
OXYGEN SATURATION: 98 % | HEART RATE: 81 BPM | DIASTOLIC BLOOD PRESSURE: 77 MMHG | BODY MASS INDEX: 24.73 KG/M2 | WEIGHT: 167 LBS | RESPIRATION RATE: 18 BRPM | HEIGHT: 69 IN | SYSTOLIC BLOOD PRESSURE: 137 MMHG

## 2023-05-05 DIAGNOSIS — G95.9 DISEASE OF SPINAL CORD, UNSPECIFIED: ICD-10-CM

## 2023-05-05 PROCEDURE — 99205 OFFICE O/P NEW HI 60 MIN: CPT

## 2023-05-12 ENCOUNTER — APPOINTMENT (OUTPATIENT)
Dept: INTERNAL MEDICINE | Facility: CLINIC | Age: 73
End: 2023-05-12
Payer: MEDICARE

## 2023-05-12 VITALS
BODY MASS INDEX: 25.18 KG/M2 | SYSTOLIC BLOOD PRESSURE: 124 MMHG | TEMPERATURE: 94.9 F | RESPIRATION RATE: 18 BRPM | WEIGHT: 170 LBS | DIASTOLIC BLOOD PRESSURE: 79 MMHG | HEART RATE: 91 BPM | OXYGEN SATURATION: 96 % | HEIGHT: 69 IN

## 2023-05-12 DIAGNOSIS — R26.81 UNSTEADINESS ON FEET: ICD-10-CM

## 2023-05-12 PROCEDURE — 99213 OFFICE O/P EST LOW 20 MIN: CPT

## 2023-05-12 NOTE — REVIEW OF SYSTEMS
[Feeling Poorly] : feeling poorly [Difficulty Walking] : difficulty walking [Frequent Falls] : frequent falls [Feeling Tired] : not feeling tired [Confused or Disoriented] : no confusion [Facial Weakness] : no facial weakness [Arm Weakness] : no arm weakness [Leg Weakness] : no leg weakness [Numbness] : no numbness [Tingling] : no tingling [Seizures] : no convulsions [Dizziness] : no dizziness [Cluster Headache] : no cluster headache [Eyesight Problems] : no eyesight problems [Chest Pain] : no chest pain [Incontinence] : no incontinence [Muscle Weakness] : no muscle weakness

## 2023-05-12 NOTE — REASON FOR VISIT
[New Patient Visit] : a new patient visit [Referred By: _________] : Patient was referred by JENNIFER [FreeTextEntry1] : NPH-  shunt discussion

## 2023-05-12 NOTE — HISTORY OF PRESENT ILLNESS
[de-identified] : Jose is a 72 year old male referred by Dr. Taras Salinas for NPH and potential  shunt. Symptoms started a couple of months ago, described as "skating" on the floor - doesn't lift up feet. 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 Denies having any cognitive deficits or urinary incontinence. \par \par Denies being on any blood thinners. \par \par

## 2023-05-12 NOTE — ASSESSMENT
[FreeTextEntry1] : MRI brain w/wo contrast from 3/13/23 reviewed by Dr. Wasserman with patient demonstrates hydrocephalus. He has characteristic magnetic gait suggesting symptomatic hydrocephalus\par \par Recommend high volume LP. Possible  shunt placement pending response to LP\par \par Return to the office following LP to evaluate symptom progress and discuss treatment plan\par \par Patient and patient's family verbalize agreement and understanding with plan of care.\par \par I, Dr. Wasserman, personally performed the evaluation and management (E/M) services for this established patient who presents today with (a) new problem(s)/exacerbation of (an) existing condition(s).  That E/M includes conducting the examination, assessing all new/exacerbated conditions, and establishing a new plan of care.  Today, my ACP, Martha Hui, was here to observe my evaluation and management services for this new problem/exacerbated condition to be followed going forward.\par

## 2023-05-24 RX ORDER — CHLORHEXIDINE GLUCONATE 213 G/1000ML
1 SOLUTION TOPICAL ONCE
Refills: 0 | Status: DISCONTINUED | OUTPATIENT
Start: 2023-05-25 | End: 2023-06-08

## 2023-05-25 ENCOUNTER — OUTPATIENT (OUTPATIENT)
Dept: OUTPATIENT SERVICES | Facility: HOSPITAL | Age: 73
LOS: 1 days | End: 2023-05-25
Payer: MEDICARE

## 2023-05-25 ENCOUNTER — OUTPATIENT (OUTPATIENT)
Dept: OUTPATIENT SERVICES | Facility: HOSPITAL | Age: 73
LOS: 1 days | Discharge: HOME | End: 2023-05-25
Payer: MEDICARE

## 2023-05-25 ENCOUNTER — APPOINTMENT (OUTPATIENT)
Dept: NEUROSURGERY | Facility: CLINIC | Age: 73
End: 2023-05-25
Payer: MEDICARE

## 2023-05-25 PROCEDURE — 71046 X-RAY EXAM CHEST 2 VIEWS: CPT

## 2023-05-25 PROCEDURE — 62329 THER SPI PNXR CSF FLUOR/CT: CPT

## 2023-05-25 PROCEDURE — 99215 OFFICE O/P EST HI 40 MIN: CPT

## 2023-05-25 PROCEDURE — 71046 X-RAY EXAM CHEST 2 VIEWS: CPT | Mod: 26

## 2023-05-25 RX ORDER — CHLORHEXIDINE GLUCONATE 213 G/1000ML
1 SOLUTION TOPICAL ONCE
Refills: 0 | Status: DISCONTINUED | OUTPATIENT
Start: 2023-05-25 | End: 2023-06-08

## 2023-05-25 NOTE — HISTORY OF PRESENT ILLNESS
[FreeTextEntry1] : \par Jose is a 72 year old male referred by Dr. Taras Salinas for NPH and potential  shunt. Symptoms started a couple of months ago, described as "skating" on the floor - doesn't lift up feet. 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 Denies having any cognitive deficits or urinary incontinence. \par \par Denies being on any blood thinners. \par \par \par  \par Active Problems\par Ankle swelling (719.07) (M25.473)\par Balance problem (781.99) (R26.89)\par Bilateral impacted cerumen (380.4) (H61.23)\par Bilateral sensorineural hearing loss (389.18) (H90.3)\par BPH (benign prostatic hyperplasia) (600.00) (N40.0)\par BPH with obstruction/lower urinary tract symptoms (600.01,599.69) (N40.1,N13.8)\par Cough (786.2) (R05.9)\par COVID-19 vaccine series completed (V87.49) (Z92.29)\par COVID-19 virus infection (079.89) (U07.1)\par Elevated PSA (790.93) (R97.20)\par Essential hypertension (401.9) (I10)\par Excessive cerumen in ear canal, right (380.4) (H61.21)\par Gait instability (781.2) (R26.81)\par GERD (gastroesophageal reflux disease) (530.81) (K21.9)\par Hematuria (599.70) (R31.9)\par Kidney stone (592.0) (N20.0)\par Myelopathy (336.9) (G95.9)\par Parkinsonism (332.0) (G20)\par Sensation of plugged ear on right side (388.8) (H93.8X1)\par Urgency of urination (788.63) (R39.15)\par \par Past Medical History\par History of conjunctivitis (V12.49) (Z86.69)\par History of kidney stones (V13.01) (Z87.442)\par \par Surgical History\par History of Percutaneous nephrolithotomy\par \par Family History\par No pertinent family history\par \par Current Meds\par amLODIPine Besylate 10 MG Oral Tablet; TAKE 1 TABLET BY MOUTH EVERY DAY AS\par DIRECTED\par Flomax 0.4 MG CP24\par Paxlovid (300/100) 20 x 150 MG & 10 x 100MG Oral Tablet Therapy Pack; TAKE TWICE\par DAILY FOR 5 DAYS AS DIRECTED ON PACKAGE\par Potassium Citrate ER 10 MEQ (1080 MG) Oral Tablet Extended Release; TAKE 2\par TABLETS BY MOUTH TWICE  DAILY\par Tamsulosin HCl - 0.4 MG Oral Capsule; TAKE 1 CAPSULE BY MOUTH DAILY AT\par BEDTIME\par Tamsulosin HCl - 0.4 MG Oral Capsule; TAKE 2 CAPSULES BY MOUTH EVERY  NIGHT\par AT BEDTIME

## 2023-05-25 NOTE — ASSESSMENT
[FreeTextEntry1] : Patient has hydrocephalus on brain imaging and clinical syndrome consistent with Normal Pressure Hydrocephalus. He has had improvement after high volume lumbar puncture. I discussed proceeding  shunt placement with the patient and his wife to address his hydrocephalus. Risks of surgery including but not limited to intracranial hemorrhage, infection, stroke, bowel perforation, need for reoperation discussed, failure to improve clinically. Patient and wife understand and wish to proceed with  shunt placement.

## 2023-05-25 NOTE — PROGRESS NOTE ADULT - SUBJECTIVE AND OBJECTIVE BOX
Procedure: high volume lumbar puncture  Doctor: Dr. Wasserman  Consent: Signed by: patient                   NAME/NUMBER if not patient:     SUBJECTIVE:  72 year old male referred by Dr. Taras Salinas for NPH and potential  shunt. Symptoms started a couple of months ago, described as "skating" on the floor - doesn't lift up feet. 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 Denies having any cognitive deficits or urinary incontinence.     Denies being on any blood thinners.   Patient presents today for high volume lumbar puncture    PMHx: as above  PSHx: percutaneous nephrolithotomy  Social Hx: non-smoker, no alcohol, no drug use  Medications: amlodipine, flomax  Allergies: NKDA    T(C): --  HR: --  BP: --  RR: --  SpO2: --  Wt(kg): --    EXAM:  Neurological: AOx3, NAD, FC, speech coherent   CN II-XII: EOMI, PERRL, face symmetric, tongue midline   Motor: MAEx4 5/5 UE and LE B/L   SILT throughout  WWP throughout   No pronator drift   Cardiovascular: normal rate and rhythm  Respiratory: unlabored breathing on RA   Gastrointestinal: abd soft, NT, ND   Extremities: no LE edema, DP pulses intact                Pregnancy test (serum hcg for any female under 56, must be resulted day before OR): [ ] Negative Result  [ ] Positive Result  [X ] N/A : male or female>55 y/o      COVID swab (in past 72hrs): n/a    CXR: normal  EKG: NSR  ECHO if available: n/a  Medical Clearances: see paper chart    Heparin drip:               If yes --> Needs to be held 2 hours prior to angiogram, order placed: []yes   []no, primary team aware []yes   [X]no   []n/a    Contrast allergy: [] yes   [X] no  If yes --> premedication ordered []y []n    Implanted Devices (pacemaker, drug pump...etc):  []YES   [X] NO                  If yes --> EPS consulted to interrogate device: [ ] YES  [ ] NO                            If yes -->  EPS called to let them know patient going for procedure: [ ] device needs to be turned off                                                                                                                                                 [ ] magnet needs to be placed for surgery                                                                                                                                                [ ] nothing to do per EP, may proceed with cautery use in OR                                       Assessment:  72 year old male with pmhx BPH, unsteady gait presenting for high volume lumbar puncture for possible  shunt placement.     Plan:    - Consent for cerebral angiogram obtained and in chart, all risks, benefits and alternatives discussed including risk of bleeding at access site, infection, spinal headache, stroke, vessel dissection  - NPO/IVF  - Return to cath lab recovery post procedure     Assessment:  Present when checked    []  GCS  E   V  M     Heart Failure: []Acute, [] acute on chronic , []chronic  Heart Failure:  [] Diastolic (HFpEF), [] Systolic (HFrEF), []Combined (HFpEF and HFrEF), [] RHF, [] Pulm HTN, [] Other    [] TAMMIE, [] ATN, [] AIN, [] other  [] CKD1, [] CKD2, [] CKD 3, [] CKD 4, [] CKD 5, []ESRD    Encephalopathy: [] Metabolic, [] Hepatic, [] toxic, [] Neurological, [] Other    Abnormal Nurtitional Status: [] malnurtition (see nutrition note), [ ]underweight: BMI < 19, [] morbid obesity: BMI >40, [] Cachexia    [] Sepsis  [] hypovolemic shock,[] cardiogenic shock, [] hemorrhagic shock, [] neuogenic shock  [] Acute Respiratory Failure  []Cerebral edema, [] Brain compression/ herniation,   [] Functional quadriplegia  [] Acute blood loss anemia

## 2023-05-25 NOTE — REASON FOR VISIT
[Follow-Up: _____] : a [unfilled] follow-up visit [FreeTextEntry1] : Patient notes improvement in his walking s/p high volume lumbar puncture. OP 16 cm H2O, 30 cc removed, CP 10cm H2O\par He had mild cognitive deficits and some urinary incontinence\par Neurology workup by Dr. Salinas suggests normal pressure hydrocephalus etiology

## 2023-05-25 NOTE — DATA REVIEWED
[de-identified] : MRI brain w/wo contrast from 3/13/23 reviewed by Dr. Wasserman with patient demonstrates hydrocephalus. He has characteristic magnetic gait suggesting symptomatic hydrocephalus\par MRI brain w/wo contrast from 3/13/23 reviewed by Dr. Wasserman with patient demonstrates hydrocephalus. He has characteristic magnetic gait suggesting symptomatic hydrocephalus

## 2023-05-25 NOTE — PHYSICAL EXAM
[FreeTextEntry1] : Awake \par Alert\par DRAPER x 4 [General Appearance - Alert] : alert [Motor Tone] : muscle tone was normal in all four extremities [Neck Appearance] : the appearance of the neck was normal [] : no respiratory distress

## 2023-05-30 DIAGNOSIS — G95.9 DISEASE OF SPINAL CORD, UNSPECIFIED: ICD-10-CM

## 2023-05-30 DIAGNOSIS — N40.1 BENIGN PROSTATIC HYPERPLASIA WITH LOWER URINARY TRACT SYMPTOMS: ICD-10-CM

## 2023-05-30 DIAGNOSIS — N13.8 OTHER OBSTRUCTIVE AND REFLUX UROPATHY: ICD-10-CM

## 2023-05-30 DIAGNOSIS — R26.81 UNSTEADINESS ON FEET: ICD-10-CM

## 2023-05-30 DIAGNOSIS — I10 ESSENTIAL (PRIMARY) HYPERTENSION: ICD-10-CM

## 2023-05-30 DIAGNOSIS — G20 PARKINSON'S DISEASE: ICD-10-CM

## 2023-05-30 DIAGNOSIS — Z86.16 PERSONAL HISTORY OF COVID-19: ICD-10-CM

## 2023-05-30 NOTE — ASSESSMENT
[FreeTextEntry1] : Good risk for Lumbar puncture \par Will discuss with Dr Wasserman\par \par \par Addendum\par Recent labs reviewed;\par Also EKG normal\par He is cleared for OR;  shunt\par Any questions feel free to call me

## 2023-05-30 NOTE — HEALTH RISK ASSESSMENT
[No] : No [No falls in past year] : Patient reported no falls in the past year [0] : 2) Feeling down, depressed, or hopeless: Not at all (0) [[id number]] : 0

## 2023-05-30 NOTE — HISTORY OF PRESENT ILLNESS
[FreeTextEntry1] : Walking getting more difficult\par Will get large volume spinal tap ;\par  If improvement then  shunt   [de-identified] : All medication without change \par Will have procedure May 25

## 2023-06-06 ENCOUNTER — TRANSCRIPTION ENCOUNTER (OUTPATIENT)
Age: 73
End: 2023-06-06

## 2023-06-06 VITALS
DIASTOLIC BLOOD PRESSURE: 80 MMHG | TEMPERATURE: 99 F | RESPIRATION RATE: 18 BRPM | SYSTOLIC BLOOD PRESSURE: 168 MMHG | WEIGHT: 161.82 LBS | OXYGEN SATURATION: 97 % | HEIGHT: 69 IN | HEART RATE: 87 BPM

## 2023-06-06 RX ORDER — POVIDONE-IODINE 5 %
1 AEROSOL (ML) TOPICAL ONCE
Refills: 0 | Status: DISCONTINUED | OUTPATIENT
Start: 2023-06-07 | End: 2023-06-07

## 2023-06-06 NOTE — PATIENT PROFILE ADULT - NSPROPOAPRESSUREINJURY_GEN_A_NUR
no
Spontaneous, unlabored and symmetrical
No Residual Tumor Seen Histology Text: There were no malignant cells seen in the sections examined.

## 2023-06-06 NOTE — PATIENT PROFILE ADULT - FALL HARM RISK - UNIVERSAL INTERVENTIONS
Bed in lowest position, wheels locked, appropriate side rails in place/Call bell, personal items and telephone in reach/Instruct patient to call for assistance before getting out of bed or chair/Non-slip footwear when patient is out of bed/[place] to call system/Physically safe environment - no spills, clutter or unnecessary equipment/Purposeful Proactive Rounding/Room/bathroom lighting operational, light cord in reach

## 2023-06-07 ENCOUNTER — TRANSCRIPTION ENCOUNTER (OUTPATIENT)
Age: 73
End: 2023-06-07

## 2023-06-07 ENCOUNTER — INPATIENT (INPATIENT)
Facility: HOSPITAL | Age: 73
LOS: 1 days | Discharge: HOME | DRG: 32 | End: 2023-06-09
Attending: NEUROLOGICAL SURGERY | Admitting: NEUROLOGICAL SURGERY
Payer: MEDICARE

## 2023-06-07 ENCOUNTER — APPOINTMENT (OUTPATIENT)
Dept: NEUROSURGERY | Facility: HOSPITAL | Age: 73
End: 2023-06-07

## 2023-06-07 DIAGNOSIS — Z98.890 OTHER SPECIFIED POSTPROCEDURAL STATES: Chronic | ICD-10-CM

## 2023-06-07 DIAGNOSIS — Z87.438 PERSONAL HISTORY OF OTHER DISEASES OF MALE GENITAL ORGANS: ICD-10-CM

## 2023-06-07 DIAGNOSIS — Z86.69 PERSONAL HISTORY OF OTHER DISEASES OF THE NERVOUS SYSTEM AND SENSE ORGANS: ICD-10-CM

## 2023-06-07 DIAGNOSIS — Z98.890 OTHER SPECIFIED POSTPROCEDURAL STATES: ICD-10-CM

## 2023-06-07 DIAGNOSIS — I10 ESSENTIAL (PRIMARY) HYPERTENSION: ICD-10-CM

## 2023-06-07 LAB
ALBUMIN SERPL ELPH-MCNC: 4 G/DL — SIGNIFICANT CHANGE UP (ref 3.3–5)
ALP SERPL-CCNC: 60 U/L — SIGNIFICANT CHANGE UP (ref 40–120)
ALT FLD-CCNC: 15 U/L — SIGNIFICANT CHANGE UP (ref 10–45)
ANION GAP SERPL CALC-SCNC: 10 MMOL/L — SIGNIFICANT CHANGE UP (ref 5–17)
AST SERPL-CCNC: 16 U/L — SIGNIFICANT CHANGE UP (ref 10–40)
BILIRUB SERPL-MCNC: 1.1 MG/DL — SIGNIFICANT CHANGE UP (ref 0.2–1.2)
BLD GP AB SCN SERPL QL: NEGATIVE — SIGNIFICANT CHANGE UP
BUN SERPL-MCNC: 20 MG/DL — SIGNIFICANT CHANGE UP (ref 7–23)
CALCIUM SERPL-MCNC: 9 MG/DL — SIGNIFICANT CHANGE UP (ref 8.4–10.5)
CHLORIDE SERPL-SCNC: 102 MMOL/L — SIGNIFICANT CHANGE UP (ref 96–108)
CO2 SERPL-SCNC: 26 MMOL/L — SIGNIFICANT CHANGE UP (ref 22–31)
CREAT SERPL-MCNC: 0.84 MG/DL — SIGNIFICANT CHANGE UP (ref 0.5–1.3)
EGFR: 92 ML/MIN/1.73M2 — SIGNIFICANT CHANGE UP
GLUCOSE SERPL-MCNC: 185 MG/DL — HIGH (ref 70–99)
HCT VFR BLD CALC: 44.1 % — SIGNIFICANT CHANGE UP (ref 39–50)
HGB BLD-MCNC: 15 G/DL — SIGNIFICANT CHANGE UP (ref 13–17)
MAGNESIUM SERPL-MCNC: 1.8 MG/DL — SIGNIFICANT CHANGE UP (ref 1.6–2.6)
MCHC RBC-ENTMCNC: 31.4 PG — SIGNIFICANT CHANGE UP (ref 27–34)
MCHC RBC-ENTMCNC: 34 GM/DL — SIGNIFICANT CHANGE UP (ref 32–36)
MCV RBC AUTO: 92.3 FL — SIGNIFICANT CHANGE UP (ref 80–100)
NRBC # BLD: 0 /100 WBCS — SIGNIFICANT CHANGE UP (ref 0–0)
PHOSPHATE SERPL-MCNC: 2.8 MG/DL — SIGNIFICANT CHANGE UP (ref 2.5–4.5)
PLATELET # BLD AUTO: 272 K/UL — SIGNIFICANT CHANGE UP (ref 150–400)
POTASSIUM SERPL-MCNC: 4 MMOL/L — SIGNIFICANT CHANGE UP (ref 3.5–5.3)
POTASSIUM SERPL-SCNC: 4 MMOL/L — SIGNIFICANT CHANGE UP (ref 3.5–5.3)
PROT SERPL-MCNC: 6.7 G/DL — SIGNIFICANT CHANGE UP (ref 6–8.3)
RBC # BLD: 4.78 M/UL — SIGNIFICANT CHANGE UP (ref 4.2–5.8)
RBC # FLD: 12.1 % — SIGNIFICANT CHANGE UP (ref 10.3–14.5)
RH IG SCN BLD-IMP: POSITIVE — SIGNIFICANT CHANGE UP
SODIUM SERPL-SCNC: 138 MMOL/L — SIGNIFICANT CHANGE UP (ref 135–145)
WBC # BLD: 16.53 K/UL — HIGH (ref 3.8–10.5)
WBC # FLD AUTO: 16.53 K/UL — HIGH (ref 3.8–10.5)

## 2023-06-07 PROCEDURE — 70450 CT HEAD/BRAIN W/O DYE: CPT | Mod: 26

## 2023-06-07 PROCEDURE — 74176 CT ABD & PELVIS W/O CONTRAST: CPT | Mod: 26

## 2023-06-07 PROCEDURE — 62223 ESTABLISH BRAIN CAVITY SHUNT: CPT

## 2023-06-07 PROCEDURE — 70450 CT HEAD/BRAIN W/O DYE: CPT | Mod: 26,77

## 2023-06-07 PROCEDURE — 99232 SBSQ HOSP IP/OBS MODERATE 35: CPT | Mod: GC

## 2023-06-07 PROCEDURE — 61781 SCAN PROC CRANIAL INTRA: CPT

## 2023-06-07 DEVICE — SURGIFLO HEMOSTATIC MATRIX KIT: Type: IMPLANTABLE DEVICE | Site: RIGHT | Status: FUNCTIONAL

## 2023-06-07 DEVICE — COVER BURR HOLE PLATE UN3 SHUNT W/ TAB 14MM: Type: IMPLANTABLE DEVICE | Site: RIGHT | Status: FUNCTIONAL

## 2023-06-07 DEVICE — GWIRE ANGL .035X180 STD: Type: IMPLANTABLE DEVICE | Site: RIGHT | Status: FUNCTIONAL

## 2023-06-07 DEVICE — BACTISEAL SHUNT CATH KIT WITH BARIUM: Type: IMPLANTABLE DEVICE | Site: RIGHT | Status: FUNCTIONAL

## 2023-06-07 DEVICE — SCREW UN3 AXS SELF DRILL 1.5X4MM: Type: IMPLANTABLE DEVICE | Site: RIGHT | Status: FUNCTIONAL

## 2023-06-07 DEVICE — VALVE CODMAN CERTAS PLUS INLINE WITH SIPHONGUARD: Type: IMPLANTABLE DEVICE | Site: RIGHT | Status: FUNCTIONAL

## 2023-06-07 DEVICE — SURGIFOAM PAD 8CM X 12.5CM X 10MM (100): Type: IMPLANTABLE DEVICE | Site: RIGHT | Status: FUNCTIONAL

## 2023-06-07 RX ORDER — POTASSIUM CHLORIDE 20 MEQ
10 PACKET (EA) ORAL
Refills: 0 | Status: DISCONTINUED | OUTPATIENT
Start: 2023-06-07 | End: 2023-06-07

## 2023-06-07 RX ORDER — SODIUM CHLORIDE 0.65 %
2 AEROSOL, SPRAY (ML) NASAL DAILY
Refills: 0 | Status: DISCONTINUED | OUTPATIENT
Start: 2023-06-07 | End: 2023-06-09

## 2023-06-07 RX ORDER — ACETAMINOPHEN 500 MG
1000 TABLET ORAL ONCE
Refills: 0 | Status: COMPLETED | OUTPATIENT
Start: 2023-06-07 | End: 2023-06-07

## 2023-06-07 RX ORDER — CHLORHEXIDINE GLUCONATE 213 G/1000ML
1 SOLUTION TOPICAL
Refills: 0 | Status: DISCONTINUED | OUTPATIENT
Start: 2023-06-07 | End: 2023-06-07

## 2023-06-07 RX ORDER — CEFAZOLIN SODIUM 1 G
2000 VIAL (EA) INJECTION EVERY 8 HOURS
Refills: 0 | Status: COMPLETED | OUTPATIENT
Start: 2023-06-07 | End: 2023-06-07

## 2023-06-07 RX ORDER — AMLODIPINE BESYLATE 2.5 MG/1
10 TABLET ORAL DAILY
Refills: 0 | Status: DISCONTINUED | OUTPATIENT
Start: 2023-06-07 | End: 2023-06-09

## 2023-06-07 RX ORDER — ACETAMINOPHEN 500 MG
650 TABLET ORAL EVERY 6 HOURS
Refills: 0 | Status: DISCONTINUED | OUTPATIENT
Start: 2023-06-07 | End: 2023-06-09

## 2023-06-07 RX ORDER — MEPERIDINE HYDROCHLORIDE 50 MG/ML
12.5 INJECTION INTRAMUSCULAR; INTRAVENOUS; SUBCUTANEOUS ONCE
Refills: 0 | Status: DISCONTINUED | OUTPATIENT
Start: 2023-06-07 | End: 2023-06-07

## 2023-06-07 RX ORDER — MAGNESIUM SULFATE 500 MG/ML
2 VIAL (ML) INJECTION ONCE
Refills: 0 | Status: COMPLETED | OUTPATIENT
Start: 2023-06-07 | End: 2023-06-07

## 2023-06-07 RX ORDER — ONDANSETRON 8 MG/1
4 TABLET, FILM COATED ORAL EVERY 6 HOURS
Refills: 0 | Status: DISCONTINUED | OUTPATIENT
Start: 2023-06-07 | End: 2023-06-09

## 2023-06-07 RX ORDER — OXYCODONE HYDROCHLORIDE 5 MG/1
5 TABLET ORAL EVERY 4 HOURS
Refills: 0 | Status: DISCONTINUED | OUTPATIENT
Start: 2023-06-07 | End: 2023-06-09

## 2023-06-07 RX ORDER — TAMSULOSIN HYDROCHLORIDE 0.4 MG/1
0.4 CAPSULE ORAL AT BEDTIME
Refills: 0 | Status: DISCONTINUED | OUTPATIENT
Start: 2023-06-07 | End: 2023-06-07

## 2023-06-07 RX ORDER — TAMSULOSIN HYDROCHLORIDE 0.4 MG/1
0.4 CAPSULE ORAL AT BEDTIME
Refills: 0 | Status: DISCONTINUED | OUTPATIENT
Start: 2023-06-07 | End: 2023-06-08

## 2023-06-07 RX ORDER — AMLODIPINE BESYLATE 2.5 MG/1
10 TABLET ORAL DAILY
Refills: 0 | Status: DISCONTINUED | OUTPATIENT
Start: 2023-06-07 | End: 2023-06-07

## 2023-06-07 RX ORDER — HYDROMORPHONE HYDROCHLORIDE 2 MG/ML
0.25 INJECTION INTRAMUSCULAR; INTRAVENOUS; SUBCUTANEOUS ONCE
Refills: 0 | Status: DISCONTINUED | OUTPATIENT
Start: 2023-06-07 | End: 2023-06-08

## 2023-06-07 RX ORDER — OXYCODONE HYDROCHLORIDE 5 MG/1
10 TABLET ORAL EVERY 4 HOURS
Refills: 0 | Status: DISCONTINUED | OUTPATIENT
Start: 2023-06-07 | End: 2023-06-09

## 2023-06-07 RX ORDER — POLYETHYLENE GLYCOL 3350 17 G/17G
17 POWDER, FOR SOLUTION ORAL DAILY
Refills: 0 | Status: DISCONTINUED | OUTPATIENT
Start: 2023-06-07 | End: 2023-06-09

## 2023-06-07 RX ORDER — SODIUM CHLORIDE 0.65 %
2 AEROSOL, SPRAY (ML) NASAL DAILY
Refills: 0 | Status: DISCONTINUED | OUTPATIENT
Start: 2023-06-07 | End: 2023-06-07

## 2023-06-07 RX ORDER — SENNA PLUS 8.6 MG/1
2 TABLET ORAL AT BEDTIME
Refills: 0 | Status: DISCONTINUED | OUTPATIENT
Start: 2023-06-07 | End: 2023-06-09

## 2023-06-07 RX ADMIN — Medication 25 GRAM(S): at 13:12

## 2023-06-07 RX ADMIN — AMLODIPINE BESYLATE 10 MILLIGRAM(S): 2.5 TABLET ORAL at 14:55

## 2023-06-07 RX ADMIN — MEPERIDINE HYDROCHLORIDE 12.5 MILLIGRAM(S): 50 INJECTION INTRAMUSCULAR; INTRAVENOUS; SUBCUTANEOUS at 10:55

## 2023-06-07 RX ADMIN — Medication 100 MILLIGRAM(S): at 22:39

## 2023-06-07 RX ADMIN — TAMSULOSIN HYDROCHLORIDE 0.4 MILLIGRAM(S): 0.4 CAPSULE ORAL at 22:33

## 2023-06-07 RX ADMIN — Medication 100 MILLIGRAM(S): at 14:56

## 2023-06-07 RX ADMIN — Medication 400 MILLIGRAM(S): at 11:41

## 2023-06-07 RX ADMIN — SENNA PLUS 2 TABLET(S): 8.6 TABLET ORAL at 22:33

## 2023-06-07 NOTE — H&P ADULT - HISTORY OF PRESENT ILLNESS
72M diagnosed w/ NPH p/f VPS. NPH diagnosed via LP. Had mild cog deficits and urinary incontinence, denies now. Also reports worsening gait instability x 6-12mo.     ICU Vital Signs Last 24 Hrs  T(C): --  T(F): --  HR: --  BP: --  BP(mean): --  ABP: --  ABP(mean): --  RR: --  SpO2: --     72M diagnosed w/ NPH p/f VPS. NPH diagnosed via LP. Had mild cog deficits and urinary incontinence, denies now. Also reports worsening gait instability x 6-12mo. Exam: AOx3, FC, PERRL, EOMI, no facial, 5/5 throughout, no drift      ICU Vital Signs Last 24 Hrs  T(C): --  T(F): --  HR: --  BP: --  BP(mean): --  ABP: --  ABP(mean): --  RR: --  SpO2: --

## 2023-06-07 NOTE — H&P ADULT - ASSESSMENT
72M diagnosed w/ NPH p/f VPS.   -OR this AM  -After surgery, PACU then CTH. Once CTH reviewed by nsgy, likely regional vs. tele

## 2023-06-07 NOTE — PRE-ANESTHESIA EVALUATION ADULT - NSANTHTOTALSCORECAL_ENT_A_CORE
Prior Authorization Approval    Authorization Effective Date: 12/10/2019  Authorization Expiration Date: 12/9/2020  Medication: contour next-APPROVED  Approved Dose/Quantity:   Reference #:     Insurance Company: [employer] - Phone [phone] Fax [phone]  Expected CoPay:       CoPay Card Available:      Foundation Assistance Needed:    Which Pharmacy is filling the prescription (Not needed for infusion/clinic administered): [facility] - [place], [place] - [address]  Pharmacy Notified: Yes  Patient Notified: No    Pharmacy will notify patient when medication is ready.     3

## 2023-06-07 NOTE — PRE-ANESTHESIA EVALUATION ADULT - NSANTHPEFT_GEN_ALL_CORE
General: Appearance is consistent with chronological age. No abnormal facies.  EENT: Anicteric sclera; oropharynx clear, moist mucus membranes  Cardiovascular:  Rate and rhythm evaluated  Respiratory: Unlabored breathing  Neurological: Awake and alert, moves all extremities  Constitutional: MET>4 difficult secondary to NPH

## 2023-06-08 ENCOUNTER — TRANSCRIPTION ENCOUNTER (OUTPATIENT)
Age: 73
End: 2023-06-08

## 2023-06-08 ENCOUNTER — APPOINTMENT (OUTPATIENT)
Dept: INTERNAL MEDICINE | Facility: CLINIC | Age: 73
End: 2023-06-08

## 2023-06-08 LAB
A1C WITH ESTIMATED AVERAGE GLUCOSE RESULT: 6 % — HIGH (ref 4–5.6)
ANION GAP SERPL CALC-SCNC: 8 MMOL/L — SIGNIFICANT CHANGE UP (ref 5–17)
BUN SERPL-MCNC: 20 MG/DL — SIGNIFICANT CHANGE UP (ref 7–23)
CALCIUM SERPL-MCNC: 9 MG/DL — SIGNIFICANT CHANGE UP (ref 8.4–10.5)
CHLORIDE SERPL-SCNC: 102 MMOL/L — SIGNIFICANT CHANGE UP (ref 96–108)
CO2 SERPL-SCNC: 29 MMOL/L — SIGNIFICANT CHANGE UP (ref 22–31)
CREAT SERPL-MCNC: 0.87 MG/DL — SIGNIFICANT CHANGE UP (ref 0.5–1.3)
EGFR: 91 ML/MIN/1.73M2 — SIGNIFICANT CHANGE UP
ESTIMATED AVERAGE GLUCOSE: 126 MG/DL — HIGH (ref 68–114)
GLUCOSE SERPL-MCNC: 131 MG/DL — HIGH (ref 70–99)
HCT VFR BLD CALC: 44.7 % — SIGNIFICANT CHANGE UP (ref 39–50)
HCV AB S/CO SERPL IA: 0.04 S/CO — SIGNIFICANT CHANGE UP
HCV AB SERPL-IMP: SIGNIFICANT CHANGE UP
HGB BLD-MCNC: 15.1 G/DL — SIGNIFICANT CHANGE UP (ref 13–17)
MAGNESIUM SERPL-MCNC: 2.3 MG/DL — SIGNIFICANT CHANGE UP (ref 1.6–2.6)
MCHC RBC-ENTMCNC: 30.7 PG — SIGNIFICANT CHANGE UP (ref 27–34)
MCHC RBC-ENTMCNC: 33.8 GM/DL — SIGNIFICANT CHANGE UP (ref 32–36)
MCV RBC AUTO: 90.9 FL — SIGNIFICANT CHANGE UP (ref 80–100)
NRBC # BLD: 0 /100 WBCS — SIGNIFICANT CHANGE UP (ref 0–0)
PHOSPHATE SERPL-MCNC: 3.7 MG/DL — SIGNIFICANT CHANGE UP (ref 2.5–4.5)
PLATELET # BLD AUTO: 322 K/UL — SIGNIFICANT CHANGE UP (ref 150–400)
POTASSIUM SERPL-MCNC: 3.9 MMOL/L — SIGNIFICANT CHANGE UP (ref 3.5–5.3)
POTASSIUM SERPL-SCNC: 3.9 MMOL/L — SIGNIFICANT CHANGE UP (ref 3.5–5.3)
RBC # BLD: 4.92 M/UL — SIGNIFICANT CHANGE UP (ref 4.2–5.8)
RBC # FLD: 12.2 % — SIGNIFICANT CHANGE UP (ref 10.3–14.5)
SODIUM SERPL-SCNC: 139 MMOL/L — SIGNIFICANT CHANGE UP (ref 135–145)
WBC # BLD: 15.45 K/UL — HIGH (ref 3.8–10.5)
WBC # FLD AUTO: 15.45 K/UL — HIGH (ref 3.8–10.5)

## 2023-06-08 PROCEDURE — 99232 SBSQ HOSP IP/OBS MODERATE 35: CPT | Mod: GC

## 2023-06-08 RX ORDER — TAMSULOSIN HYDROCHLORIDE 0.4 MG/1
0.8 CAPSULE ORAL AT BEDTIME
Refills: 0 | Status: DISCONTINUED | OUTPATIENT
Start: 2023-06-08 | End: 2023-06-09

## 2023-06-08 RX ADMIN — OXYCODONE HYDROCHLORIDE 5 MILLIGRAM(S): 5 TABLET ORAL at 22:14

## 2023-06-08 RX ADMIN — SENNA PLUS 2 TABLET(S): 8.6 TABLET ORAL at 22:13

## 2023-06-08 RX ADMIN — OXYCODONE HYDROCHLORIDE 5 MILLIGRAM(S): 5 TABLET ORAL at 22:15

## 2023-06-08 RX ADMIN — TAMSULOSIN HYDROCHLORIDE 0.8 MILLIGRAM(S): 0.4 CAPSULE ORAL at 22:14

## 2023-06-08 RX ADMIN — POLYETHYLENE GLYCOL 3350 17 GRAM(S): 17 POWDER, FOR SOLUTION ORAL at 11:23

## 2023-06-08 RX ADMIN — AMLODIPINE BESYLATE 10 MILLIGRAM(S): 2.5 TABLET ORAL at 06:24

## 2023-06-08 NOTE — PHYSICAL THERAPY INITIAL EVALUATION ADULT - MODALITIES TREATMENT COMMENTS
smile, cheek puff, eyebrow raise: symmetrical. tongue protrusion: midline, visual tracking (H test): smooth pursuit. visual field test (quadrant test): WNL B/L. R handed

## 2023-06-08 NOTE — OCCUPATIONAL THERAPY INITIAL EVALUATION ADULT - GENERAL OBSERVATIONS, REHAB EVAL
Pt's RN Tito aware of intent to eval/tx; cleared Pt. PT Jazmine present. Pt received in supine - +halo dressing intact, telemetry, [facility], [place]. Pt agreeable to OT.

## 2023-06-08 NOTE — PHYSICAL THERAPY INITIAL EVALUATION ADULT - GENERAL OBSERVATIONS, REHAB EVAL
pt received semi-supine in bed +heplock, +tele, +cranial incision C/D/I, wife present, +[place], c/o abdominal discomfort

## 2023-06-08 NOTE — DISCHARGE NOTE PROVIDER - CARE PROVIDER_API CALL
Yoan Wasserman.  Neurosurgery  [address] [place] [address]  Phone: [phone]  Fax: [phone]  Follow Up Time:

## 2023-06-08 NOTE — DISCHARGE NOTE PROVIDER - NSDCMRMEDTOKEN_GEN_ALL_CORE_FT
amLODIPine 10 mg oral tablet: 1 orally once a day  Ayr Saline 0.65% nasal solution: 2 intranasally once a day  potassium citrate 10 mEq oral tablet, extended release: 2 tab(s) orally 2 times a day  tamsulosin 0.4 mg oral capsule: 1 orally once a day (at bedtime)   acetaminophen 325 mg oral tablet: 2 tab(s) orally every 6 hours As needed Temp greater or equal to 38C (100.4F), Mild Pain (1 - 3)  amLODIPine 10 mg oral tablet: 1 orally once a day  Ayr Saline 0.65% nasal solution: 2 intranasally once a day  oxyCODONE 5 mg oral tablet: 1 tab(s) orally every 6 hours as needed for  severe pain MDD: 4 tabs  polyethylene glycol 3350 oral powder for reconstitution: 17 gram(s) orally once a day  potassium citrate 10 mEq oral tablet, extended release: 2 tab(s) orally 2 times a day  tamsulosin 0.4 mg oral capsule: 2 cap(s) orally once a day (at bedtime)

## 2023-06-08 NOTE — DISCHARGE NOTE PROVIDER - NSDCCPCAREPLAN_GEN_ALL_CORE_FT
PRINCIPAL DISCHARGE DIAGNOSIS  Diagnosis: NPH (normal pressure hydrocephalus)  Assessment and Plan of Treatment: s/p VPS, Certas at 7      SECONDARY DISCHARGE DIAGNOSES  Diagnosis: HTN (hypertension)  Assessment and Plan of Treatment:     Diagnosis: History of BPH  Assessment and Plan of Treatment:      PRINCIPAL DISCHARGE DIAGNOSIS  Diagnosis: NPH (normal pressure hydrocephalus)  Assessment and Plan of Treatment: s/p VPS, Certas at 7      SECONDARY DISCHARGE DIAGNOSES  Diagnosis: HTN (hypertension)  Assessment and Plan of Treatment: continue home amlodipine    Diagnosis: History of BPH  Assessment and Plan of Treatment: Flomax increased to 0.8mg daily

## 2023-06-08 NOTE — OCCUPATIONAL THERAPY INITIAL EVALUATION ADULT - IMPAIRED TRANSFERS: SIT/STAND, REHAB EVAL
impaired balance/impaired coordination/decreased flexibility/impaired postural control/impaired sensory feedback

## 2023-06-08 NOTE — PHYSICAL THERAPY INITIAL EVALUATION ADULT - ADDITIONAL COMMENTS
pt lives w/ his wife in a house w/ a flight of stairs. Denies use of DME for ambulation. Denies hx of recent falls. States he was independent in ADLs prior to this admission

## 2023-06-08 NOTE — OCCUPATIONAL THERAPY INITIAL EVALUATION ADULT - PERTINENT HX OF CURRENT PROBLEM, REHAB EVAL
72M diagnosed w/ NPH p/f VPS. NPH diagnosed via LP. Had mild cog deficits and urinary incontinence, denies now. Also reports worsening gait instability x 6-12 months.

## 2023-06-08 NOTE — PHYSICAL THERAPY INITIAL EVALUATION ADULT - GAIT DEVIATIONS NOTED, PT EVAL
decreased henrietta/increased time in double stance/decreased step length/decreased weight-shifting ability
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
## 2023-06-08 NOTE — OCCUPATIONAL THERAPY INITIAL EVALUATION ADULT - MD ORDER
NP (normal pressure hydrocephalus)  Now s/p Right Frontal VPS on 6/7/23 NPH (normal pressure hydrocephalus)  Now s/p Right Frontal VPS on 6/7/23

## 2023-06-08 NOTE — PHYSICAL THERAPY INITIAL EVALUATION ADULT - PERTINENT HX OF CURRENT PROBLEM, REHAB EVAL
72M diagnosed w/ NPH p/f VPS. NPH diagnosed via LP. Had mild cog deficits and urinary incontinence, denies now. Also reports worsening gait instability x 6-12mo. Exam: AOx3, FC, PERRL, EOMI, no facial, 5/5 throughout, no drift

## 2023-06-08 NOTE — OCCUPATIONAL THERAPY INITIAL EVALUATION ADULT - ADDITIONAL COMMENTS
Pt lives w/ wife in private house w/ 1 flight of stairs to enter and 1 flight within. Pt states that he was independent prior to admission w/ no prior use of AEs/DMEs.

## 2023-06-08 NOTE — DISCHARGE NOTE PROVIDER - NSDCFUADDAPPT_GEN_ALL_CORE_FT
Please follow up with Dr. Wasserman    Please follow up with your primary care doctor  Please follow up with Martha (NO with Dr. Wasserman) on 6/20 at 1PM, call the office to confirm appointment at [phone]    Please follow up with your primary care doctor  Please follow up with Martha (NP with Dr. Wasserman) on 6/20 at 1PM, call the office to confirm appointment at [phone]    Please follow up with your primary care doctor

## 2023-06-08 NOTE — DISCHARGE NOTE PROVIDER - NSDCFUADDINST_GEN_ALL_CORE_FT
Neurosurgery follow up appointment date/time:  - are staples/sutures in place?  - what day should staples/sutures be removed (POD 10-14)?  - please call the office to confirm appointment:     Wound Care:  - can patient shower?  - does dressing need to be changed/removed?  - no picking at incision  - wears glasses?   - pressure ulcer?     Devices:  - does patient need collar or brace or helmet?   - does collar/brace need to be worn at all times or just when OOB?  - RW or cane for ambulation?    Drains/Lines:  - PICC in place? ID follow up? (Paper Rx for: antibiotics, heparin flush, weekly lab draws)  - PONCHO in place? Management?  - dill in place? Management/Urology follow up?  - Tracheostomy?  - PEG tube?      Activity:  - fatigue is common after surgery, rest if you feel tired   - no bending, lifting, twisting or heavy lifting   - walking is recommended, ambulate as tolerated  - you may shower when you get home, keep your incision dry  - no soaking in a tub/pool/hot tub   - no driving within 24 hours of anesthesia or while taking prescription pain medications   - keep hydrated, drink plenty of water   - skullbase precautions: no nose blowing, sneeze with mouth open, no drinking out of a straw, no straining      Inpatient consults:  - final recommendations  - you will need follow up with....    Please also follow up with your primary care doctor.     Pain Expectations:  - pain after surgery is expected  - please take pain meds as prescribed     Medications:  - changes to home meds (ex. AED's)?  - new meds?  - pain meds?  - when can antiplatelets or anticoagulants be restarted?  - were adverse affects of meds discussed with patients?   - pain medications can cause constipation, you should eat a high fiber diet and may take a stool softener while on pain meds   - Avoid taking Advil (ibuprofen), Motrin (naproxen), or Aspirin for pain as they can cause bleeding     Call the office or come to ED if:  - wound has drainage or bleeding, increased redness or pain at incision site, neurological change, fever (>101), chills, night sweats, syncope, nausea/vomiting, chest pain, shortness of breath      Playback:  - are discharge instruction on playback?  - is a picture of the incision on playback?     WITHIN 24 HOURS OF DISCHARGE, PLEASE CONTACT NEURO PA  WITH ANY QUESTIONS OR CONCERNS: [phone]   OTHERWISE, PLEASE CALL THE OFFICE WITH ANY QUESTIONS OR CONCERNS: [phone] Neurosurgery follow up appointment date/time:  - are staples/sutures in place?  - what day should staples/sutures be removed (POD 10-14)?  - please call the office to confirm appointment: [phone]    Wound Care:  - can patient shower?  - does dressing need to be changed/removed?  - no picking at incision  - wears glasses?   - pressure ulcer?     Devices:  - does patient need collar or brace or helmet?   - does collar/brace need to be worn at all times or just when OOB?  - RW or cane for ambulation?    Drains/Lines:  - PICC in place? ID follow up? (Paper Rx for: antibiotics, heparin flush, weekly lab draws)  - PONCHO in place? Management?  - dill in place? Management/Urology follow up?  - Tracheostomy?  - PEG tube?      Activity:  - fatigue is common after surgery, rest if you feel tired   - no bending, lifting, twisting or heavy lifting   - walking is recommended, ambulate as tolerated  - you may shower when you get home, keep your incision dry  - no soaking in a tub/pool/hot tub   - no driving within 24 hours of anesthesia or while taking prescription pain medications   - keep hydrated, drink plenty of water     Inpatient consults:  - final recommendations  - you will need follow up with....    Please also follow up with your primary care doctor.     Pain Expectations:  - pain after surgery is expected  - please take pain meds as prescribed     Medications:  - changes to home meds (ex. AED's)?  - new meds?  - pain meds?  - when can antiplatelets or anticoagulants be restarted?  - were adverse affects of meds discussed with patients?   - pain medications can cause constipation, you should eat a high fiber diet and may take a stool softener while on pain meds   - Avoid taking Advil (ibuprofen), Motrin (naproxen), or Aspirin for pain as they can cause bleeding     Call the office or come to ED if:  - wound has drainage or bleeding, increased redness or pain at incision site, neurological change, fever (>101), chills, night sweats, syncope, nausea/vomiting, chest pain, shortness of breath      Playback:  - See [facility] for a copy of your discharge paperwork     WITHIN 24 HOURS OF DISCHARGE, PLEASE CONTACT NEURO PA  WITH ANY QUESTIONS OR CONCERNS: [phone]   OTHERWISE, PLEASE CALL THE OFFICE WITH ANY QUESTIONS OR CONCERNS: [phone] Neurosurgery follow up appointment date/time:  - Follow up in the office for a wound check and staple removal   - please call the office to confirm appointment: [phone]    Wound Care:  - shower and wash hair daily   - pat dry incision after showering with a clean towel  - leave incision uncovered, open to air   - no picking at incision    Devices/Drains/Lines:  - VPS, Certas at 7   - RW or cane for ambulation?    Activity:  - fatigue is common after surgery, rest if you feel tired   - no bending, lifting, twisting or heavy lifting   - walking is recommended, ambulate as tolerated  - you may shower when you get home, keep your incision dry  - no soaking in a tub/pool/hot tub   - no driving within 24 hours of anesthesia or while taking prescription pain medications   - keep hydrated, drink plenty of water     Please also follow up with your primary care doctor.     Pain Expectations:  - pain after surgery is expected  - please take pain meds as prescribed     Medications:  - changes to home meds (ex. AED's)?  - new meds?  - pain meds?  - when can antiplatelets or anticoagulants be restarted?  - were adverse affects of meds discussed with patients?   - pain medications can cause constipation, you should eat a high fiber diet and may take a stool softener while on pain meds   - Avoid taking Advil (ibuprofen), Motrin (naproxen), or Aspirin for pain as they can cause bleeding     Call the office or come to ED if:  - wound has drainage or bleeding, increased redness or pain at incision site, neurological change, fever (>101), chills, night sweats, syncope, nausea/vomiting, chest pain, shortness of breath      Playback:  - See [facility] for a copy of your discharge paperwork     WITHIN 24 HOURS OF DISCHARGE, PLEASE CONTACT NEURO PA  WITH ANY QUESTIONS OR CONCERNS: [phone]   OTHERWISE, PLEASE CALL THE OFFICE WITH ANY QUESTIONS OR CONCERNS: [phone] Neurosurgery follow up appointment date/time: 6/20 at 1PM  - Follow up in the office for a wound check and staple removal   - please call the office to confirm appointment: [phone]    Wound Care:  - shower and wash hair daily   - pat dry incision after showering with a clean towel  - leave incision uncovered, open to air   - no picking at incision    Devices/Drains/Lines:  - VPS, Certas at 7   - Rolling walker for ambulation    Activity:  - fatigue is common after surgery, rest if you feel tired   - no bending, lifting, twisting or heavy lifting   - walking is recommended, ambulate as tolerated  - you may shower when you get home, keep your incision dry  - no soaking in a tub/pool/hot tub   - no driving within 24 hours of anesthesia or while taking prescription pain medications   - keep hydrated, drink plenty of water     Please also follow up with your primary care doctor.     Pain Expectations:  - pain after surgery is expected  - please take pain meds as prescribed     Medications:  - Home Flomax increased to 0.8mg daily (can cause dizziness)   - Otherwise, continue home meds as prescribed: Amlodipine  - pain meds: Tylenol 500mg every 6 hours as needed for mild to moderate pain, Oxycodone 5mg every 6 hours as needed for severe pain (can cause dizziness, constipation)   - adverse affects of meds discussed with patient  - pain medications can cause constipation, you should eat a high fiber diet and may take a stool softener while on pain meds   - Avoid taking Advil (ibuprofen), Motrin (naproxen), or Aspirin for pain as they can cause bleeding     Call the office or come to ED if:  - wound has drainage or bleeding, increased redness or pain at incision site, neurological change, fever (>101), chills, night sweats, syncope, nausea/vomiting, chest pain, shortness of breath      Playback:  - See [facility] for a copy of your discharge paperwork     WITHIN 24 HOURS OF DISCHARGE, PLEASE CONTACT NEURO PA  WITH ANY QUESTIONS OR CONCERNS: [phone]   OTHERWISE, PLEASE CALL THE OFFICE WITH ANY QUESTIONS OR CONCERNS: [phone]

## 2023-06-08 NOTE — DISCHARGE NOTE PROVIDER - HOSPITAL COURSE
HPI:    Hospital Course:    Patient evaluated by PT/OT who recommended:  Patient is going home? rehab? hospice? Facility Name:     Hospital course c/b:     Exam on day of discharge:    Checklist:   - Obtained follow up appointment from NP  - Reviewed final recommendations of inpatient consults  - review discharge planning on provider handoff  - post op imaging completed  - Neurologically stable for discharge  - Vitals stable for discharge   - Afebrile for discharge  - WBC is stable  - Sodium level is normal  - Pain is adequately controlled  - Pt has PICC/walker/brace/collar   - LACE score (10 or > needs PCP apt)   - stroke patient? Discharge NIHSS score   HPI:  72M PMH BPH, kidney stones, NPH (mild cog deficits, urinary incontinence, gait instability, presenting for elective VPS.      Hospital Course:  6/7: POD 0 s/p R VPS. Post op CTH/CT Abd complete.  6/8: POD 2. PRUDENCIO overnight, neuro intact, surgical dressings clean/dry, pending PT/OT evaluation    Patient evaluated by PT/OT who recommended:  Patient is going home? rehab? hospice? Facility Name:     Hospital course c/b:     Exam on day of discharge:    Checklist:   - Obtained follow up appointment from NP  - Reviewed final recommendations of inpatient consults  - review discharge planning on provider handoff  - post op imaging completed  - Neurologically stable for discharge  - Vitals stable for discharge   - Afebrile for discharge  - WBC is stable  - Sodium level is normal  - Pain is adequately controlled  - Pt has PICC/walker/brace/collar   - LACE score (10 or > needs PCP apt)   - stroke patient? Discharge NIHSS score   HPI:  72M PMH BPH, kidney stones, NPH (mild cog deficits, urinary incontinence, gait instability, presenting for elective VPS.      Hospital Course:  6/7: POD 0 s/p R VPS. Post op CTH/CT Abd complete.  6/8: POD 2. PRUDENCIO overnight, neuro intact, surgical dressings clean/dry, pending PT/OT evaluation  6/9: POD 2. PRUDENCIO overnight.     Patient evaluated by PT/OT who recommended:  Patient is going home? rehab? hospice? Facility Name:     Hospital course c/b:     Exam on day of discharge:    Checklist:   - Obtained follow up appointment from NP  - Reviewed final recommendations of inpatient consults  - review discharge planning on provider handoff  - post op imaging completed  - Neurologically stable for discharge  - Vitals stable for discharge   - Afebrile for discharge  - WBC is stable  - Sodium level is normal  - Pain is adequately controlled  - Pt has PICC/walker/brace/collar   - LACE score (10 or > needs PCP apt)   - stroke patient? Discharge NIHSS score   HPI:  72M PMH BPH, kidney stones, NPH (mild cog deficits, urinary incontinence, gait instability, presenting for elective VPS.      Hospital Course:  6/7: POD 0 s/p R VPS. Post op CTH/CT Abd complete.  6/8: POD 2. PRUDENCIO overnight, neuro intact, surgical dressings clean/dry, pending PT/OT evaluation  6/9: POD 2. PRUDENCIO overnight.     Patient evaluated by PT/OT who recommended: home with outpatient Pt and OT  Patient is going home    Hospital course c/b: urinary retention (s/p straight cath as needed, improved with increase flomax), scalp hematoma (cleared for discharge by Dr. Wasserman with no need for repeat imaging)     Exam on day of discharge:  General: patient seen laying supine in bed in NAD on RA  Neuro: AAOx3, FC, OE spontaneously, speech clear and fluent, CNII-XI grossly intact, face symmetric, no pronator drift, strength 5/5 b/l UE and LE  HEENT: PERRL, EOMI  Pulmonary: chest rise symmetric  Abdomen: soft, nontender, mild distention  Ext: perfusing well  Skin: warm, dry  Wound: Crani and abdominal incision site c/d/i with staples in place    Patient is neuro stable, vitals stable, afebrile, medically ready for discharge   Of note, post op CT head showing scalp hematoma, no need for repeat imaging per Dr. Wasserman. Patient has not passed gas after surgery, cleared for discharge by Dr. Wasserman.

## 2023-06-08 NOTE — PHYSICAL THERAPY INITIAL EVALUATION ADULT - IMPAIRMENTS FOUND, PT EVAL
aerobic capacity/endurance/gait, locomotion, and balance/gross motor/joint integrity and mobility/posture

## 2023-06-09 ENCOUNTER — TRANSCRIPTION ENCOUNTER (OUTPATIENT)
Age: 73
End: 2023-06-09

## 2023-06-09 VITALS — TEMPERATURE: 98 F

## 2023-06-09 LAB
ANION GAP SERPL CALC-SCNC: 11 MMOL/L — SIGNIFICANT CHANGE UP (ref 5–17)
BUN SERPL-MCNC: 20 MG/DL — SIGNIFICANT CHANGE UP (ref 7–23)
CALCIUM SERPL-MCNC: 8.8 MG/DL — SIGNIFICANT CHANGE UP (ref 8.4–10.5)
CHLORIDE SERPL-SCNC: 101 MMOL/L — SIGNIFICANT CHANGE UP (ref 96–108)
CO2 SERPL-SCNC: 25 MMOL/L — SIGNIFICANT CHANGE UP (ref 22–31)
CREAT SERPL-MCNC: 0.76 MG/DL — SIGNIFICANT CHANGE UP (ref 0.5–1.3)
EGFR: 94 ML/MIN/1.73M2 — SIGNIFICANT CHANGE UP
GLUCOSE SERPL-MCNC: 138 MG/DL — HIGH (ref 70–99)
HCT VFR BLD CALC: 42.4 % — SIGNIFICANT CHANGE UP (ref 39–50)
HGB BLD-MCNC: 14.7 G/DL — SIGNIFICANT CHANGE UP (ref 13–17)
MAGNESIUM SERPL-MCNC: 1.8 MG/DL — SIGNIFICANT CHANGE UP (ref 1.6–2.6)
MCHC RBC-ENTMCNC: 31.5 PG — SIGNIFICANT CHANGE UP (ref 27–34)
MCHC RBC-ENTMCNC: 34.7 GM/DL — SIGNIFICANT CHANGE UP (ref 32–36)
MCV RBC AUTO: 90.8 FL — SIGNIFICANT CHANGE UP (ref 80–100)
NRBC # BLD: 0 /100 WBCS — SIGNIFICANT CHANGE UP (ref 0–0)
PHOSPHATE SERPL-MCNC: 3.3 MG/DL — SIGNIFICANT CHANGE UP (ref 2.5–4.5)
PLATELET # BLD AUTO: 277 K/UL — SIGNIFICANT CHANGE UP (ref 150–400)
POTASSIUM SERPL-MCNC: 3.7 MMOL/L — SIGNIFICANT CHANGE UP (ref 3.5–5.3)
POTASSIUM SERPL-SCNC: 3.7 MMOL/L — SIGNIFICANT CHANGE UP (ref 3.5–5.3)
RBC # BLD: 4.67 M/UL — SIGNIFICANT CHANGE UP (ref 4.2–5.8)
RBC # FLD: 12.5 % — SIGNIFICANT CHANGE UP (ref 10.3–14.5)
SODIUM SERPL-SCNC: 137 MMOL/L — SIGNIFICANT CHANGE UP (ref 135–145)
WBC # BLD: 11.74 K/UL — HIGH (ref 3.8–10.5)
WBC # FLD AUTO: 11.74 K/UL — HIGH (ref 3.8–10.5)

## 2023-06-09 PROCEDURE — 86803 HEPATITIS C AB TEST: CPT

## 2023-06-09 PROCEDURE — 80048 BASIC METABOLIC PNL TOTAL CA: CPT

## 2023-06-09 PROCEDURE — 97162 PT EVAL MOD COMPLEX 30 MIN: CPT

## 2023-06-09 PROCEDURE — 74019 RADEX ABDOMEN 2 VIEWS: CPT

## 2023-06-09 PROCEDURE — 84100 ASSAY OF PHOSPHORUS: CPT

## 2023-06-09 PROCEDURE — 83735 ASSAY OF MAGNESIUM: CPT

## 2023-06-09 PROCEDURE — 70450 CT HEAD/BRAIN W/O DYE: CPT

## 2023-06-09 PROCEDURE — 74176 CT ABD & PELVIS W/O CONTRAST: CPT

## 2023-06-09 PROCEDURE — 85027 COMPLETE CBC AUTOMATED: CPT

## 2023-06-09 PROCEDURE — 86900 BLOOD TYPING SEROLOGIC ABO: CPT

## 2023-06-09 PROCEDURE — 80053 COMPREHEN METABOLIC PANEL: CPT

## 2023-06-09 PROCEDURE — C1769: CPT

## 2023-06-09 PROCEDURE — 99232 SBSQ HOSP IP/OBS MODERATE 35: CPT | Mod: GC

## 2023-06-09 PROCEDURE — 74019 RADEX ABDOMEN 2 VIEWS: CPT | Mod: 26

## 2023-06-09 PROCEDURE — 86850 RBC ANTIBODY SCREEN: CPT

## 2023-06-09 PROCEDURE — 86901 BLOOD TYPING SEROLOGIC RH(D): CPT

## 2023-06-09 PROCEDURE — 36415 COLL VENOUS BLD VENIPUNCTURE: CPT

## 2023-06-09 PROCEDURE — C1889: CPT

## 2023-06-09 PROCEDURE — 83036 HEMOGLOBIN GLYCOSYLATED A1C: CPT

## 2023-06-09 PROCEDURE — C1713: CPT

## 2023-06-09 RX ORDER — TAMSULOSIN HYDROCHLORIDE 0.4 MG/1
1 CAPSULE ORAL
Refills: 0 | DISCHARGE

## 2023-06-09 RX ORDER — OXYCODONE HYDROCHLORIDE 5 MG/1
1 TABLET ORAL
Qty: 20 | Refills: 0
Start: 2023-06-09 | End: 2023-06-13

## 2023-06-09 RX ORDER — ACETAMINOPHEN 500 MG
2 TABLET ORAL
Qty: 0 | Refills: 0 | DISCHARGE
Start: 2023-06-09

## 2023-06-09 RX ORDER — POLYETHYLENE GLYCOL 3350 17 G/17G
17 POWDER, FOR SOLUTION ORAL
Qty: 510 | Refills: 0
Start: 2023-06-09 | End: 2023-07-08

## 2023-06-09 RX ORDER — TAMSULOSIN HYDROCHLORIDE 0.4 MG/1
2 CAPSULE ORAL
Qty: 60 | Refills: 0
Start: 2023-06-09 | End: 2023-07-08

## 2023-06-09 RX ADMIN — OXYCODONE HYDROCHLORIDE 5 MILLIGRAM(S): 5 TABLET ORAL at 04:00

## 2023-06-09 RX ADMIN — OXYCODONE HYDROCHLORIDE 5 MILLIGRAM(S): 5 TABLET ORAL at 04:38

## 2023-06-09 RX ADMIN — AMLODIPINE BESYLATE 10 MILLIGRAM(S): 2.5 TABLET ORAL at 05:40

## 2023-06-09 RX ADMIN — POLYETHYLENE GLYCOL 3350 17 GRAM(S): 17 POWDER, FOR SOLUTION ORAL at 12:27

## 2023-06-09 NOTE — DISCHARGE NOTE NURSING/CASE MANAGEMENT/SOCIAL WORK - NSDCFUADDAPPT_GEN_ALL_CORE_FT
Please follow up with Martha (NO with Dr. Wasserman) on 6/20 at 1PM, call the office to confirm appointment at [phone]    Please follow up with your primary care doctor

## 2023-06-09 NOTE — PROGRESS NOTE ADULT - SUBJECTIVE AND OBJECTIVE BOX
72M PMH BPH, kidney stones, NPH (mild cog deficits, urinary incontinence, gait instability, presenting for elective VPS.    Hospital Course:  6/7: POD 0 s/p R VPS. Post op CTH/CT Abd complete.  6/8: POD 2. PRUDENCIO overnight, neuro intact, surgical dressings clean/dry, pending PT/OT evaluation      Vital Signs Last 24 Hrs  T(C): 36.8 (07 Jun 2023 22:00), Max: 37.2 (07 Jun 2023 06:51)  T(F): 98.3 (07 Jun 2023 22:00), Max: 98.6 (07 Jun 2023 17:21)  HR: 108 (07 Jun 2023 21:06) (83 - 108)  BP: 156/77 (07 Jun 2023 21:06) (132/63 - 168/80)  BP(mean): 107 (07 Jun 2023 21:06) (89 - 113)  RR: 18 (07 Jun 2023 21:06) (13 - 19)  SpO2: 93% (07 Jun 2023 21:06) (92% - 97%)    Parameters below as of 07 Jun 2023 21:06  Patient On (Oxygen Delivery Method): room air        I&O's Detail    07 Jun 2023 07:01  -  08 Jun 2023 00:39  --------------------------------------------------------  IN:  Total IN: 0 mL    OUT:    Voided (mL): 350 mL  Total OUT: 350 mL    Total NET: -350 mL        I&O's Summary    07 Jun 2023 07:01  -  08 Jun 2023 00:39  --------------------------------------------------------  IN: 0 mL / OUT: 350 mL / NET: -350 mL        PHYSICAL EXAM:  General: NAD  HEENT: CN II-XII grossly intact, PERRL 3mm briskly reactive, EOMI b/l, face symmetric, tongue midline, neck FROM  Cardiovascular: RRR, normal S1 and S2   Respiratory: lungs CTAB, no wheezing, rhonchi, or crackles   GI: normoactive BS to auscultation, abd soft, NTND   Neuro: A&Ox3, No aphasia, speech clear, no dysmetria, no pronator drift. Follows commands.  DRAPER x4 spontaneously, 5/5 strength in all extremities throughout. SILT throughout   Extremities: distal pulses 2+ x4  Wound/incision: R Cranial and abd incisions with dressing in place - c/d/i    TUBES/LINES:  [] CVC  [] A-line  [] Lumbar Drain  [] Ventriculostomy  [] Other    DIET:  [] NPO  [x] Mechanical  [] Tube feeds    LABS:                        15.0   16.53 )-----------( 272      ( 07 Jun 2023 11:14 )             44.1     06-07    138  |  102  |  20  ----------------------------<  185<H>  4.0   |  26  |  0.84    Ca    9.0      07 Jun 2023 11:14  Phos  2.8     06-07  Mg     1.8     06-07    TPro  6.7  /  Alb  4.0  /  TBili  1.1  /  DBili  x   /  AST  16  /  ALT  15  /  AlkPhos  60  06-07            CAPILLARY BLOOD GLUCOSE          Drug Levels: [] N/A    CSF Analysis: [] N/A      Allergies    No Known Allergies    Intolerances      MEDICATIONS:  Antibiotics:    Neuro:  acetaminophen     Tablet .. 650 milliGRAM(s) Oral every 6 hours PRN  HYDROmorphone  Injectable 0.25 milliGRAM(s) IV Push once PRN  ondansetron Injectable 4 milliGRAM(s) IV Push every 6 hours PRN  oxyCODONE    IR 5 milliGRAM(s) Oral every 4 hours PRN  oxyCODONE    IR 10 milliGRAM(s) Oral every 4 hours PRN    Anticoagulation:    OTHER:  amLODIPine   Tablet 10 milliGRAM(s) Oral daily  polyethylene glycol 3350 17 Gram(s) Oral daily  senna 2 Tablet(s) Oral at bedtime  sodium chloride 0.65% Nasal 2 Spray(s) Both Nostrils daily PRN  tamsulosin 0.4 milliGRAM(s) Oral at bedtime    IVF:    CULTURES:    RADIOLOGY & ADDITIONAL TESTS:      ASSESSMENT:  72M PMH BPH, kidney stones s/p perc nephrostomy, NPH (mild cog deficits, urinary incontinence, gait instability, presenting for elective VPS, now s/p R frontal VPS (Certas @7) on 6/7.    G91.9    Handoff    MEWS Score    H/O hydrocephalus    Ankle swelling    Balance problem    History of BPH    2019 novel coronavirus disease (COVID-19)    HTN (hypertension)    GERD (gastroesophageal reflux disease)    Kidney stone    MP (myelopathy)    Parkinsonism    NPH (normal pressure hydrocephalus)    NPH (normal pressure hydrocephalus)    H/O hydrocephalus    History of BPH    HTN (hypertension)    Postoperative state     shunt    History of surgery    SysAdmin_VstLnk        PLAN:  NEURO:  - neuro/vitals q4h  - pain control  - post op CTH/CT Abd completed  - PT/OT    CARDIOVASCULAR:  - -140  - continue home amlodipine 10mg qd    PULMONARY:  - room air  - incentive spirometry     RENAL:  - IVL  - voiding  - KCl 10mg BID (trend K)     GI:  - reg diet  - bowel regimen    HEME:  - SCDs for DVT ppx    ID:  - post op Ancef    ENDO:  - f/u A1C    DISPO:  - full code, SDU status, pending PT/OT    Discussed with Dr. Wasserman      Assessment:  Present when checked    []  GCS  E   V  M     Heart Failure: []Acute, [] acute on chronic , []chronic  Heart Failure:  [] Diastolic (HFpEF), [] Systolic (HFrEF), []Combined (HFpEF and HFrEF), [] RHF, [] Pulm HTN, [] Other    [] TAMMIE, [] ATN, [] AIN, [] other  [] CKD1, [] CKD2, [] CKD 3, [] CKD 4, [] CKD 5, []ESRD    Encephalopathy: [] Metabolic, [] Hepatic, [] toxic, [] Neurological, [] Other    Abnormal Nurtitional Status: [] malnurtition (see nutrition note), [ ]underweight: BMI < 19, [] morbid obesity: BMI >40, [] Cachexia    [] Sepsis  [] hypovolemic shock,[] cardiogenic shock, [] hemorrhagic shock, [] neuogenic shock  [] Acute Respiratory Failure  []Cerebral edema, [] Brain compression/ herniation,   [] Functional quadriplegia  [] Acute blood loss anemia  
HPI:  72M diagnosed w/ NPH p/f VPS. NPH diagnosed via LP. Had mild cog deficits and urinary incontinence, denies now. Also reports worsening gait instability x 6-12mo. Exam: AOx3, FC, PERRL, EOMI, no facial, 5/5 throughout, no drift     (07 Jun 2023 06:24)    OVERNIGHT EVENTS: PRUDENCIO, neuro stable.     Hospital Course:   6/7: POD 0 s/p R VPS. Post op CTH/CT Abd complete.  6/8: POD 1. PRUDENCIO overnight, neuro intact, surgical dressings clean/dry, pending PT/OT evaluation.   6/9: POD 2. PRUDENCIO overnight.     Vital Signs Last 24 Hrs  T(C): 36.9 (09 Jun 2023 00:12), Max: 36.9 (08 Jun 2023 09:22)  T(F): 98.4 (09 Jun 2023 00:12), Max: 98.5 (08 Jun 2023 09:22)  HR: 64 (09 Jun 2023 00:12) (64 - 110)  BP: 149/50 (08 Jun 2023 20:37) (133/65 - 150/72)  BP(mean): 92 (08 Jun 2023 16:05) (89 - 104)  RR: 18 (09 Jun 2023 00:12) (18 - 18)  SpO2: 91% (09 Jun 2023 00:12) (91% - 96%)    Parameters below as of 09 Jun 2023 00:12  Patient On (Oxygen Delivery Method): room air        I&O's Summary    07 Jun 2023 07:01  -  08 Jun 2023 07:00  --------------------------------------------------------  IN: 0 mL / OUT: 900 mL / NET: -900 mL    08 Jun 2023 07:01  -  09 Jun 2023 00:55  --------------------------------------------------------  IN: 960 mL / OUT: 1500 mL / NET: -540 mL        PHYSICAL EXAM:  General: patient seen laying supine in bed in NAD on RA  Neuro: AAOx3, FC, OE spontaneously, speech clear and fluent, CNII-XI grossly intact, face symmetric, no pronator drift, strength 5/5 b/l UE and LE  HEENT: PERRL, EOMI  Pulmonary: chest rise symmetric  Abdomen: soft, nontender, mild distention  Ext: perfusing well  Skin: warm, dry  Wound: Crani and abdominal incision site c/d/i w/ headwrap and dressing in place    TUBES/LINES:  [] Mustafa  [] Lumbar Drain  [] Wound Drains  [] Others      DIET:  [] NPO  [x] Mechanical  [] Tube feeds    LABS:                        15.1   15.45 )-----------( 322      ( 08 Jun 2023 08:31 )             44.7     06-08    139  |  102  |  20  ----------------------------<  131<H>  3.9   |  29  |  0.87    Ca    9.0      08 Jun 2023 08:31  Phos  3.7     06-08  Mg     2.3     06-08    TPro  6.7  /  Alb  4.0  /  TBili  1.1  /  DBili  x   /  AST  16  /  ALT  15  /  AlkPhos  60  06-07            CAPILLARY BLOOD GLUCOSE          Drug Levels: [] N/A    CSF Analysis: [] N/A      Allergies    No Known Allergies    Intolerances      MEDICATIONS:  Antibiotics:    Neuro:  acetaminophen     Tablet .. 650 milliGRAM(s) Oral every 6 hours PRN  ondansetron Injectable 4 milliGRAM(s) IV Push every 6 hours PRN  oxyCODONE    IR 5 milliGRAM(s) Oral every 4 hours PRN  oxyCODONE    IR 10 milliGRAM(s) Oral every 4 hours PRN    Anticoagulation:    OTHER:  amLODIPine   Tablet 10 milliGRAM(s) Oral daily  polyethylene glycol 3350 17 Gram(s) Oral daily  senna 2 Tablet(s) Oral at bedtime  sodium chloride 0.65% Nasal 2 Spray(s) Both Nostrils daily PRN  tamsulosin 0.8 milliGRAM(s) Oral at bedtime    IVF:    CULTURES:    RADIOLOGY & ADDITIONAL TESTS:  < from: CT Head No Cont (06.07.23 @ 14:31) >    Impression:    Since prior CT head of the same day, interval placement of right frontal   approach ventricular shunt catheter associated postsurgical changes as   described above..    --- End of Report ---    < end of copied text >  < from: CT Abdomen and Pelvis No Cont (06.07.23 @ 14:33) >  IMPRESSION:  Status post  shunt catheter placement, tip is in the right upper   quadrant abdomen.        --- End of Report ---    < end of copied text >      ASSESSMENT:  72M PMH BPH, kidney stones, NPH (mild cog deficits, urinary incontinence, gait instability, presenting for elective VPS, now s/p R frontal VPS (Certas @7) on 6/7.    G91.9    Handoff    MEWS Score    H/O hydrocephalus    Ankle swelling    Balance problem    History of BPH    2019 novel coronavirus disease (COVID-19)    HTN (hypertension)    GERD (gastroesophageal reflux disease)    Kidney stone    MP (myelopathy)    Parkinsonism    NPH (normal pressure hydrocephalus)    NPH (normal pressure hydrocephalus)     shunt    NPH (normal pressure hydrocephalus)    H/O hydrocephalus    History of BPH    HTN (hypertension)    Postoperative state     shunt    History of surgery    History of BPH    HTN (hypertension)    SysAdmin_VstLnk        PLAN:  NEURO:  - neuro/vitals q4h  - pain control  - post op CTH/CT Abd completed    CARDIOVASCULAR:  - -140  - continue home amlodipine 10mg qd    PULMONARY:  - room air  - incentive spirometry     RENAL:  - IVL  - voiding  - KCl 10mg BID (trend K)     GI:  - reg diet  - bowel regimen    HEME:  - SCDs for DVT ppx    ID:  - post op Ancef    ENDO:  - f/u A1C    DISPO:  - full code, SDU status, pending outpatient OT/PT     Discussed with Dr. Wasserman  Assessment:  Present when checked    []  GCS  E   V  M     Heart Failure: []Acute, [] acute on chronic , []chronic  Heart Failure:  [] Diastolic (HFpEF), [] Systolic (HFrEF), []Combined (HFpEF and HFrEF), [] RHF, [] Pulm HTN, [] Other    [] TAMMIE, [] ATN, [] AIN, [] other  [] CKD1, [] CKD2, [] CKD 3, [] CKD 4, [] CKD 5, []ESRD    Encephalopathy: [] Metabolic, [] Hepatic, [] toxic, [] Neurological, [] Other    Abnormal Nurtitional Status: [] malnurtition (see nutrition note), [ ]underweight: BMI < 19, [] morbid obesity: BMI >40, [] Cachexia    [] Sepsis  [] hypovolemic shock,[] cardiogenic shock, [] hemorrhagic shock, [] neuogenic shock  [] Acute Respiratory Failure  []Cerebral edema, [] Brain compression/ herniation,   [] Functional quadriplegia  [] Acute blood loss anemia  
NEUROSURGERY POST OP NOTE:    POD# 0 S/P R frontal  Shunt (Certas - 7)    S: Seen and examined in PACU. Reports incisional pain, improving with meds. Denies HA, N/V, chest pain, shortness of breath, new weakness or numbness.       T(C): 36.3 (06-07-23 @ 10:42), Max: 37.2 (06-07-23 @ 06:51)  HR: 86 (06-07-23 @ 10:57) (86 - 97)  BP: 135/61 (06-07-23 @ 10:57) (135/61 - 168/80)  RR: 14 (06-07-23 @ 10:57) (14 - 19)  SpO2: 97% (06-07-23 @ 10:57) (96% - 97%)        acetaminophen     Tablet .. 650 milliGRAM(s) Oral every 6 hours PRN  amLODIPine   Tablet 10 milliGRAM(s) Oral daily  ceFAZolin   IVPB 2000 milliGRAM(s) IV Intermittent every 8 hours  ondansetron Injectable 4 milliGRAM(s) IV Push every 6 hours PRN  oxyCODONE    IR 5 milliGRAM(s) Oral every 4 hours PRN  oxyCODONE    IR 10 milliGRAM(s) Oral every 4 hours PRN  polyethylene glycol 3350 17 Gram(s) Oral daily  potassium chloride    Tablet ER 10 milliEquivalent(s) Oral two times a day  senna 2 Tablet(s) Oral at bedtime  sodium chloride 0.65% Nasal 2 Spray(s) Both Nostrils daily PRN  tamsulosin 0.4 milliGRAM(s) Oral at bedtime      RADIOLOGY:     Exam:  General: NAD  HEENT: CN II-XII grossly intact, PERRL 3mm briskly reactive, EOMI b/l, face symmetric, tongue midline, neck FROM  Cardiovascular: RRR, normal S1 and S2   Respiratory: lungs CTAB, no wheezing, rhonchi, or crackles   GI: normoactive BS to auscultation, abd soft, NTND   Neuro: A&Ox3, No aphasia, speech clear, no dysmetria, no pronator drift. Follows commands.  DRAPER x4 spontaneously, 5/5 strength in all extremities throughout. SILT throughout   Extremities: distal pulses 2+ x4  Wound/incision: R Cranial and abd incisions with dressing in place - c/d/i      Assessment: 72M PMH BPH, kidney stones s/p perc nephrostomy, NPH (mild cog deficits, urinary incontinence, gait instability, presenting for elective VPS, now s/p R frontal VPS (Certas @7) on 6/7.      Plan:  NEURO:  - neuro/vitals q4h  - pain control  - post op CTH/CT Abd pending  - PT/OT    CARDIOVASCULAR:  - -140  - continue home amlodipine 10mg qd    PULMONARY:  - room air  - incentive spirometry     RENAL:  - IVF until adequate PO intake  - voiding  - KCl 10mg BID (trend K)     GI:  - ADAT  - bowel regimen    HEME:  - SCDs for DVT ppx    ID:  - post op Ancef    ENDO:  - f/u A1C    DISPO:  - full code, SDU status, pending PT/OT    Discussed with Dr. Wasserman  
INTERVAL HPI/OVERNIGHT EVENTS:  Patient well known to me for many years;  Found to have NPH and is S/P  shunt today  Also history of kidney stones, hypertension , BPH      MEDICATIONS  (STANDING):  amLODIPine   Tablet 10 milliGRAM(s) Oral daily  ceFAZolin   IVPB 2000 milliGRAM(s) IV Intermittent every 8 hours  polyethylene glycol 3350 17 Gram(s) Oral daily  potassium chloride    Tablet ER 10 milliEquivalent(s) Oral two times a day  senna 2 Tablet(s) Oral at bedtime  tamsulosin 0.4 milliGRAM(s) Oral at bedtime    MEDICATIONS  (PRN):  acetaminophen     Tablet .. 650 milliGRAM(s) Oral every 6 hours PRN Temp greater or equal to 38C (100.4F), Mild Pain (1 - 3)  HYDROmorphone  Injectable 0.25 milliGRAM(s) IV Push once PRN breakthrough pain  ondansetron Injectable 4 milliGRAM(s) IV Push every 6 hours PRN Nausea and/or Vomiting  oxyCODONE    IR 5 milliGRAM(s) Oral every 4 hours PRN Moderate Pain (4 - 6)  oxyCODONE    IR 10 milliGRAM(s) Oral every 4 hours PRN Severe Pain (7 - 10)  sodium chloride 0.65% Nasal 2 Spray(s) Both Nostrils daily PRN Nasal Congestion      Allergies    No Known Allergies    Intolerances        Vital Signs Last 24 Hrs  T(C): 37 (07 Jun 2023 17:21), Max: 37.2 (07 Jun 2023 06:51)  T(F): 98.6 (07 Jun 2023 17:21), Max: 98.6 (07 Jun 2023 17:21)  HR: 102 (07 Jun 2023 16:35) (83 - 102)  BP: 156/73 (07 Jun 2023 16:35) (132/63 - 168/80)  BP(mean): 105 (07 Jun 2023 16:35) (89 - 113)  RR: 18 (07 Jun 2023 16:35) (13 - 19)  SpO2: 93% (07 Jun 2023 16:35) (92% - 97%)    Parameters below as of 07 Jun 2023 16:35  Patient On (Oxygen Delivery Method): room air              Constitutional: Awake and Alert     Eyes: KERWIN    ENMT: Negative    Neck: Supple    Back:  no tenderness     Respiratory:  clear    Cardiovascular: S1 S2    Gastrointestinal: soft     Genitourinary:    Extremities: no edema     Vascular:    Neurological:    Skin:    Lymph Nodes:            LABS:                        15.0   16.53 )-----------( 272      ( 07 Jun 2023 11:14 )             44.1     06-07    138  |  102  |  20  ----------------------------<  185<H>  4.0   |  26  |  0.84    Ca    9.0      07 Jun 2023 11:14  Phos  2.8     06-07  Mg     1.8     06-07    TPro  6.7  /  Alb  4.0  /  TBili  1.1  /  DBili  x   /  AST  16  /  ALT  15  /  AlkPhos  60  06-07          RADIOLOGY & ADDITIONAL TESTS:  
INTERVAL HPI/OVERNIGHT EVENTS:  No chief complaint;  Will go home today      MEDICATIONS  (STANDING):  amLODIPine   Tablet 10 milliGRAM(s) Oral daily  polyethylene glycol 3350 17 Gram(s) Oral daily  senna 2 Tablet(s) Oral at bedtime  tamsulosin 0.8 milliGRAM(s) Oral at bedtime    MEDICATIONS  (PRN):  acetaminophen     Tablet .. 650 milliGRAM(s) Oral every 6 hours PRN Temp greater or equal to 38C (100.4F), Mild Pain (1 - 3)  ondansetron Injectable 4 milliGRAM(s) IV Push every 6 hours PRN Nausea and/or Vomiting  oxyCODONE    IR 5 milliGRAM(s) Oral every 4 hours PRN Moderate Pain (4 - 6)  oxyCODONE    IR 10 milliGRAM(s) Oral every 4 hours PRN Severe Pain (7 - 10)  sodium chloride 0.65% Nasal 2 Spray(s) Both Nostrils daily PRN Nasal Congestion      Allergies    No Known Allergies    Intolerances        Vital Signs Last 24 Hrs  T(C): 36.9 (09 Jun 2023 09:20), Max: 36.9 (08 Jun 2023 14:12)  T(F): 98.5 (09 Jun 2023 09:20), Max: 98.5 (08 Jun 2023 18:22)  HR: 96 (09 Jun 2023 08:54) (64 - 110)  BP: 149/68 (09 Jun 2023 08:54) (133/65 - 152/70)  BP(mean): 98 (09 Jun 2023 08:54) (89 - 98)  RR: 18 (09 Jun 2023 08:54) (16 - 18)  SpO2: 93% (09 Jun 2023 08:54) (91% - 96%)    Parameters below as of 09 Jun 2023 08:54  Patient On (Oxygen Delivery Method): room air              Constitutional:  Awake and alert      Eyes: KERWIN    ENMT: Negative    Neck: Supple    Back:  no tenderness     Respiratory:  clear    Cardiovascular: S1 S2    Gastrointestinal:  soft     Genitourinary:    Extremities:  no edema     Vascular:    Neurological:    Skin:    Lymph Nodes:            06-08 @ 07:01  -  06-09 @ 07:00  --------------------------------------------------------  IN: 960 mL / OUT: 2350 mL / NET: -1390 mL      LABS:                        14.7   11.74 )-----------( 277      ( 09 Jun 2023 05:30 )             42.4     06-09    137  |  101  |  20  ----------------------------<  138<H>  3.7   |  25  |  0.76    Ca    8.8      09 Jun 2023 05:30  Phos  3.3     06-09  Mg     1.8     06-09    TPro  6.7  /  Alb  4.0  /  TBili  1.1  /  DBili  x   /  AST  16  /  ALT  15  /  AlkPhos  60  06-07          RADIOLOGY & ADDITIONAL TESTS:  
INTERVAL HPI/OVERNIGHT EVENTS:  Interim reviewed;  Having difficulties urinating  Ambulating   Seems to be improved       MEDICATIONS  (STANDING):  amLODIPine   Tablet 10 milliGRAM(s) Oral daily  polyethylene glycol 3350 17 Gram(s) Oral daily  senna 2 Tablet(s) Oral at bedtime  tamsulosin 0.8 milliGRAM(s) Oral at bedtime    MEDICATIONS  (PRN):  acetaminophen     Tablet .. 650 milliGRAM(s) Oral every 6 hours PRN Temp greater or equal to 38C (100.4F), Mild Pain (1 - 3)  ondansetron Injectable 4 milliGRAM(s) IV Push every 6 hours PRN Nausea and/or Vomiting  oxyCODONE    IR 5 milliGRAM(s) Oral every 4 hours PRN Moderate Pain (4 - 6)  oxyCODONE    IR 10 milliGRAM(s) Oral every 4 hours PRN Severe Pain (7 - 10)  sodium chloride 0.65% Nasal 2 Spray(s) Both Nostrils daily PRN Nasal Congestion      Allergies    No Known Allergies    Intolerances        Vital Signs Last 24 Hrs  T(C): 36.9 (08 Jun 2023 09:22), Max: 37 (07 Jun 2023 17:21)  T(F): 98.5 (08 Jun 2023 09:22), Max: 98.6 (07 Jun 2023 17:21)  HR: 96 (08 Jun 2023 09:05) (80 - 108)  BP: 150/67 (08 Jun 2023 09:05) (132/63 - 167/75)  BP(mean): 96 (08 Jun 2023 09:05) (89 - 113)  RR: 18 (08 Jun 2023 09:05) (13 - 19)  SpO2: 94% (08 Jun 2023 09:05) (92% - 97%)    Parameters below as of 08 Jun 2023 09:05  Patient On (Oxygen Delivery Method): room air              Constitutional: Awake     Eyes: KERWIN    ENMT: Negative    Neck: Supple    Back:  no tenderness     Respiratory:  clear    Cardiovascular: S1 S2    Gastrointestinal: soft     Genitourinary:    Extremities: no edema     Vascular:    Neurological:    Skin:    Lymph Nodes:            06-07 @ 07:01  -  06-08 @ 07:00  --------------------------------------------------------  IN: 0 mL / OUT: 900 mL / NET: -900 mL    06-08 @ 07:01  -  06-08 @ 11:00  --------------------------------------------------------  IN: 240 mL / OUT: 275 mL / NET: -35 mL      LABS:                        15.1   15.45 )-----------( 322      ( 08 Jun 2023 08:31 )             44.7     06-08    139  |  102  |  20  ----------------------------<  131<H>  3.9   |  29  |  0.87    Ca    9.0      08 Jun 2023 08:31  Phos  3.7     06-08  Mg     2.3     06-08    TPro  6.7  /  Alb  4.0  /  TBili  1.1  /  DBili  x   /  AST  16  /  ALT  15  /  AlkPhos  60  06-07          RADIOLOGY & ADDITIONAL TESTS:

## 2023-06-09 NOTE — DISCHARGE NOTE NURSING/CASE MANAGEMENT/SOCIAL WORK - PATIENT PORTAL LINK FT
You can access the FollowMyHealth Patient Portal offered by [facility] by registering at the following website: [email or web address]. By joining [employer]’s FollowMyHealth portal, you will also be able to view your health information using other applications (apps) compatible with our system.

## 2023-06-09 NOTE — PROGRESS NOTE ADULT - TIME BILLING
Patient seen and examined;  Home today  No change in medication with discharge
Patient seen and examined ;  Urination issue noted;  Will increase Flomax to 0,8  Physical therapy
Stable post op;  Back on home medication  Will  follow

## 2023-06-12 ENCOUNTER — APPOINTMENT (OUTPATIENT)
Dept: UROLOGY | Facility: CLINIC | Age: 73
End: 2023-06-12
Payer: MEDICARE

## 2023-06-12 PROBLEM — Z86.69 PERSONAL HISTORY OF OTHER DISEASES OF THE NERVOUS SYSTEM AND SENSE ORGANS: Chronic | Status: ACTIVE | Noted: 2023-06-06

## 2023-06-12 PROBLEM — M25.473 EFFUSION, UNSPECIFIED ANKLE: Chronic | Status: ACTIVE | Noted: 2023-06-06

## 2023-06-12 PROBLEM — Z87.438 PERSONAL HISTORY OF OTHER DISEASES OF MALE GENITAL ORGANS: Chronic | Status: ACTIVE | Noted: 2023-06-06

## 2023-06-12 PROBLEM — I10 ESSENTIAL (PRIMARY) HYPERTENSION: Chronic | Status: ACTIVE | Noted: 2023-06-06

## 2023-06-12 PROBLEM — U07.1 COVID-19: Chronic | Status: ACTIVE | Noted: 2023-06-06

## 2023-06-12 PROBLEM — R26.89 OTHER ABNORMALITIES OF GAIT AND MOBILITY: Chronic | Status: ACTIVE | Noted: 2023-06-06

## 2023-06-12 PROBLEM — G95.9 DISEASE OF SPINAL CORD, UNSPECIFIED: Chronic | Status: ACTIVE | Noted: 2023-06-06

## 2023-06-12 PROBLEM — N20.0 CALCULUS OF KIDNEY: Chronic | Status: ACTIVE | Noted: 2023-06-06

## 2023-06-12 PROCEDURE — 51702 INSERT TEMP BLADDER CATH: CPT

## 2023-06-12 PROCEDURE — 99213 OFFICE O/P EST LOW 20 MIN: CPT | Mod: 25

## 2023-06-12 NOTE — ASSESSMENT
[FreeTextEntry1] : 71 yo male with urinary retention s/p  shunt for NPH.  He will continue taking tamsulosin.  He has been constipated and will continue to use laxatives provided by the hospital.  We will try to remove the dill Friday morning for a TOV.  \par \par Plan:\par 1. Dill catheter to gravity drainage\par 2.  Bowel regimen for constipation\par 3. Cont tamsulosin\par 4. RTC Friday AM for TOV

## 2023-06-12 NOTE — PHYSICAL EXAM
[General Appearance - Well Developed] : well developed [General Appearance - Well Nourished] : well nourished [Normal Appearance] : normal appearance [Well Groomed] : well groomed [General Appearance - In No Acute Distress] : no acute distress [Abdomen Tenderness] : non-tender [Costovertebral Angle Tenderness] : no ~M costovertebral angle tenderness [Skin Color & Pigmentation] : normal skin color and pigmentation [Heart Rate And Rhythm] : Heart rate and rhythm were normal [] : no respiratory distress [Oriented To Time, Place, And Person] : oriented to person, place, and time [Normal Station and Gait] : the gait and station were normal for the patient's age [No Focal Deficits] : no focal deficits [FreeTextEntry1] : m [Urethral Meatus] : meatus normal

## 2023-06-12 NOTE — HISTORY OF PRESENT ILLNESS
[FreeTextEntry1] : 3/14/23:\par 73 yo male referred for elevated PSA and LUTS.  He has a hx of fluctuating PSA and per the patient is s/p TRUS biopsy several years ago which was negative.  He may have also had another biopsy at some point after that which was also negative,  He has never had a prostate MRI due to claustrophobia.  His most recent PSA was from 3/2/23 and was 6.44.  \par \par He also complains of frequency and urgency.  He is on tamsulosin which helps, but he is mainly bothered by urgency and frequency.  He denies dysuria or hematuria. \par \par \par Of note, he has a hx of stones and is s/p PCNL several years ago with Dr. Huffman. \par \par PVR = 0 cc (LUTS)\par IPSS = 12; QoL = 3\par \par *************\par 6/12/23:\par Patient returns after shunt placement on 6/14/23. Post-op he was straight cathed x2. He went home and was urinating very small amounts over the weekend. Today it has improved slightly. \par \par PVR: >612 ml (LUTS)\par
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
## 2023-06-13 DIAGNOSIS — G20 PARKINSON'S DISEASE: ICD-10-CM

## 2023-06-13 DIAGNOSIS — Z79.899 OTHER LONG TERM (CURRENT) DRUG THERAPY: ICD-10-CM

## 2023-06-13 DIAGNOSIS — R33.8 OTHER RETENTION OF URINE: ICD-10-CM

## 2023-06-13 DIAGNOSIS — G91.9 HYDROCEPHALUS, UNSPECIFIED: ICD-10-CM

## 2023-06-13 DIAGNOSIS — K21.9 GASTRO-ESOPHAGEAL REFLUX DISEASE WITHOUT ESOPHAGITIS: ICD-10-CM

## 2023-06-13 DIAGNOSIS — L76.32 POSTPROCEDURAL HEMATOMA OF SKIN AND SUBCUTANEOUS TISSUE FOLLOWING OTHER PROCEDURE: ICD-10-CM

## 2023-06-13 DIAGNOSIS — N40.1 BENIGN PROSTATIC HYPERPLASIA WITH LOWER URINARY TRACT SYMPTOMS: ICD-10-CM

## 2023-06-13 DIAGNOSIS — G91.2 (IDIOPATHIC) NORMAL PRESSURE HYDROCEPHALUS: ICD-10-CM

## 2023-06-13 DIAGNOSIS — R26.81 UNSTEADINESS ON FEET: ICD-10-CM

## 2023-06-13 DIAGNOSIS — F09 UNSPECIFIED MENTAL DISORDER DUE TO KNOWN PHYSIOLOGICAL CONDITION: ICD-10-CM
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
## 2023-06-13 DIAGNOSIS — R32 UNSPECIFIED URINARY INCONTINENCE: ICD-10-CM

## 2023-06-13 DIAGNOSIS — I10 ESSENTIAL (PRIMARY) HYPERTENSION: ICD-10-CM
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
## 2023-06-13 DIAGNOSIS — Z86.16 PERSONAL HISTORY OF COVID-19: ICD-10-CM

## 2023-06-14 LAB — BACTERIA UR CULT: NORMAL

## 2023-06-16 ENCOUNTER — APPOINTMENT (OUTPATIENT)
Dept: UROLOGY | Facility: CLINIC | Age: 73
End: 2023-06-16
Payer: MEDICARE

## 2023-06-16 LAB
BILIRUB UR QL STRIP: NORMAL
CLARITY UR: CLEAR
COLLECTION METHOD: NORMAL
GLUCOSE UR-MCNC: NORMAL
HCG UR QL: 0.2 EU/DL
HGB UR QL STRIP.AUTO: NORMAL
KETONES UR-MCNC: NORMAL
LEUKOCYTE ESTERASE UR QL STRIP: NORMAL
NITRITE UR QL STRIP: NORMAL
PH UR STRIP: 6.5
PROT UR STRIP-MCNC: NORMAL
SP GR UR STRIP: 1.02

## 2023-06-16 PROCEDURE — A4216: CPT | Mod: NC

## 2023-06-16 PROCEDURE — 99213 OFFICE O/P EST LOW 20 MIN: CPT | Mod: 25

## 2023-06-16 PROCEDURE — 51700 IRRIGATION OF BLADDER: CPT

## 2023-06-16 NOTE — PHYSICAL EXAM
[General Appearance - Well Developed] : well developed [Normal Appearance] : normal appearance [Abdomen Soft] : soft [Urethral Meatus] : meatus normal [Skin Color & Pigmentation] : normal skin color and pigmentation [Heart Rate And Rhythm] : Heart rate and rhythm were normal [] : no respiratory distress [Oriented To Time, Place, And Person] : oriented to person, place, and time [Normal Station and Gait] : the gait and station were normal for the patient's age [No Focal Deficits] : no focal deficits [FreeTextEntry1] : Mustafa to gravity drainage

## 2023-06-16 NOTE — ASSESSMENT
[FreeTextEntry1] : 73 yo male voided after TOV.  he will continue with BPH meds.  He will get the prostate MRI for hx of elevated PSA.  He will RTC in 2 weeks for PVR check. \par \par

## 2023-06-16 NOTE — HISTORY OF PRESENT ILLNESS
[FreeTextEntry1] : 3/14/23:\par 73 yo male referred for elevated PSA and LUTS.  He has a hx of fluctuating PSA and per the patient is s/p TRUS biopsy several years ago which was negative.  He may have also had another biopsy at some point after that which was also negative,  He has never had a prostate MRI due to claustrophobia.  His most recent PSA was from 3/2/23 and was 6.44.  \par \par He also complains of frequency and urgency.  He is on tamsulosin which helps, but he is mainly bothered by urgency and frequency.  He denies dysuria or hematuria. \par \par \par Of note, he has a hx of stones and is s/p PCNL several years ago with Dr. Huffman. \par \par PVR = 0 cc (LUTS)\par IPSS = 12; QoL = 3\par \par *************\par 6/12/23:\par Patient returns after shunt placement on 6/14/23. Post-op he was straight cathed x2. He went home and was urinating very small amounts over the weekend. Today it has improved slightly. \par \par PVR: >612 ml (LUTS)\par \par ************\par 6/16/23:\par Patient returns for TOV after going into retention following  shunt placement.  \par \par TOV:\par -120 cc sterile saline instilled in bladder\par - Catheter remove din its entirety\par - Patient voided after 2 hours,\par PVR = 95 cc\par

## 2023-06-17 ENCOUNTER — EMERGENCY (EMERGENCY)
Facility: HOSPITAL | Age: 73
LOS: 1 days | Discharge: HOME | End: 2023-06-17
Attending: EMERGENCY MEDICINE | Admitting: EMERGENCY MEDICINE
Payer: MEDICARE

## 2023-06-17 VITALS
TEMPERATURE: 98 F | SYSTOLIC BLOOD PRESSURE: 148 MMHG | RESPIRATION RATE: 16 BRPM | OXYGEN SATURATION: 98 % | DIASTOLIC BLOOD PRESSURE: 78 MMHG | HEART RATE: 92 BPM

## 2023-06-17 VITALS
WEIGHT: 169.98 LBS | HEIGHT: 69 IN | TEMPERATURE: 98 F | SYSTOLIC BLOOD PRESSURE: 185 MMHG | RESPIRATION RATE: 20 BRPM | DIASTOLIC BLOOD PRESSURE: 77 MMHG | HEART RATE: 111 BPM | OXYGEN SATURATION: 96 %

## 2023-06-17 DIAGNOSIS — Z98.890 OTHER SPECIFIED POSTPROCEDURAL STATES: Chronic | ICD-10-CM

## 2023-06-17 LAB
APPEARANCE UR: ABNORMAL
BACTERIA # UR AUTO: PRESENT /HPF
BILIRUB UR-MCNC: ABNORMAL
COLOR SPEC: SIGNIFICANT CHANGE UP
DIFF PNL FLD: ABNORMAL
EPI CELLS # UR: SIGNIFICANT CHANGE UP /HPF (ref 0–5)
GLUCOSE UR QL: NEGATIVE — SIGNIFICANT CHANGE UP
KETONES UR-MCNC: 15 MG/DL
LEUKOCYTE ESTERASE UR-ACNC: ABNORMAL
NITRITE UR-MCNC: POSITIVE
PH UR: 6.5 — SIGNIFICANT CHANGE UP (ref 5–8)
PROT UR-MCNC: >=300 MG/DL
RBC CASTS # UR COMP ASSIST: > 10 /HPF
SP GR SPEC: 1.02 — SIGNIFICANT CHANGE UP (ref 1–1.03)
UROBILINOGEN FLD QL: 1 E.U./DL — SIGNIFICANT CHANGE UP
WBC UR QL: ABNORMAL /HPF

## 2023-06-17 PROCEDURE — 99284 EMERGENCY DEPT VISIT MOD MDM: CPT

## 2023-06-17 PROCEDURE — 51702 INSERT TEMP BLADDER CATH: CPT

## 2023-06-17 PROCEDURE — 99283 EMERGENCY DEPT VISIT LOW MDM: CPT | Mod: 25

## 2023-06-17 PROCEDURE — 87186 SC STD MICRODIL/AGAR DIL: CPT

## 2023-06-17 PROCEDURE — 81001 URINALYSIS AUTO W/SCOPE: CPT

## 2023-06-17 PROCEDURE — 87086 URINE CULTURE/COLONY COUNT: CPT

## 2023-06-17 RX ORDER — CEFPODOXIME PROXETIL 100 MG
1 TABLET ORAL
Qty: 14 | Refills: 0
Start: 2023-06-17 | End: 2023-06-23

## 2023-06-17 NOTE — ED ADULT NURSE REASSESSMENT NOTE - NS ED NURSE REASSESS COMMENT FT1
Patient /aoX3, states feeling better, dill catheter draining pinkish colored urine, transferred to leg bag for discharge, total output 600ml.  Vital signs stable.  Discharged to home in stable condition.

## 2023-06-17 NOTE — ED PROVIDER NOTE - NSFOLLOWUPINSTRUCTIONS_ED_ALL_ED_FT
Acute Urinary Retention, Male    Acute urinary retention is a condition in which a person is unable to pass urine or can only pass a little urine. This condition can happen suddenly and last for a short time. If left untreated, it can become long-term (chronic) and result in kidney damage or other serious complications.    What are the causes?  This condition may be caused by:  Obstruction or narrowing of the tube that drains the bladder (urethra). This may be caused by surgery, problems with nearby organs, or injury to the bladder or urethra.  Problems with the nerves in the bladder.  Tumors in the area of the pelvis, bladder, or urethra.  Certain medicines.  Bladder or urinary tract infection.  Constipation.  What increases the risk?  This condition is more likely to develop in older men. As men age, their prostate may become larger and may start to press or squeeze on the bladder or the urethra. Other chronic health conditions can increase the risk of acute urinary retention. These include:  Diseases such as multiple sclerosis.  Spinal cord injuries.  Diabetes.  Degenerative cognitive conditions, such as delirium or dementia.  Psychological conditions. A man may hold his urine due to trauma or because he does not want to use the bathroom.  What are the signs or symptoms?  Symptoms of this condition include:  Trouble urinating.  Pain in the lower abdomen.  How is this diagnosed?  This condition is diagnosed based on a physical exam and your medical history. You may also have other tests, including:  An ultrasound of the bladder or kidneys or both.  Blood tests.  A urine analysis.  Additional tests may be needed, such as a CT scan, MRI, and kidney or bladder function tests.  How is this treated?  Treatment for this condition may include:  Medicines.  Placing a thin, sterile tube (catheter) into the bladder to drain urine out of the body. This is called an indwelling urinary catheter. After it is inserted, the catheter is held in place with a small balloon that is filled with sterile water. Urine drains from the catheter into a collection bag outside of the body.  Behavioral therapy.  Treatment for other conditions.  If needed, you may be treated in the hospital for kidney function problems or to manage other complications.    Follow these instructions at home:  Medicines    Take over-the-counter and prescription medicines only as told by your health care provider. Avoid certain medicines, such as decongestants, antihistamines, and some prescription medicines. Do not take any medicine unless your health care provider approves.  If you were prescribed an antibiotic medicine, take it as told by your health care provider. Do not stop using the antibiotic even if you start to feel better.  General instructions    Do not use any products that contain nicotine or tobacco. These products include cigarettes, chewing tobacco, and vaping devices, such as e-cigarettes. If you need help quitting, ask your health care provider.  Drink enough fluid to keep your urine pale yellow.  If you have an indwelling urinary catheter, follow the instructions from your health care provider.  Monitor any changes in your symptoms. Tell your health care provider about any changes.  If instructed, monitor your blood pressure at home. Report changes as told by your health care provider.  Keep all follow-up visits. This is important.  Contact a health care provider if:  You have uncomfortable bladder contractions that you cannot control (spasms).  You leak urine with the spasms.  Get help right away if:  You have chills or a fever.  You have blood in your urine.  You have a catheter and the following happens:  Your catheter stops draining urine.  Your catheter falls out.  Summary  Acute urinary retention is a condition in which a person is unable to pass urine or can only pass a little urine. If left untreated, this condition can result in kidney damage or other serious complications.  An enlarged prostate may cause this condition. As men age, their prostate gland may become larger and may press or squeeze on the bladder or the urethra.  Treatment for this condition may include medicines and placement of an indwelling urinary catheter.  Monitor any changes in your symptoms. Tell your health care provider about any changes.  This information is not intended to replace advice given to you by your health care provider. Make sure you discuss any questions you have with your health care provider.    Document Revised: 09/08/2021 Document Reviewed: 09/08/2021

## 2023-06-17 NOTE — ED ADULT NURSE NOTE - OBJECTIVE STATEMENT
Patient discharged on June 9 form hospital s/p shunt for hydrocephalus, had course or urinary retention and discharged to home with dill catheter.  Dill catheter removed from Urologist office yesterday around 11am, noted decreasing ability to urinate since then, unable to urinate at 9:30 pm last night.  Arrives to ED c/o of abdominal/bladder cramping pain with distention.  PMHx  Hydrocephalus, surgical staples in place to right side of scalp s/p shunt procedure, BPH, HTN.

## 2023-06-17 NOTE — ED ADULT NURSE REASSESSMENT NOTE - NS ED NURSE REASSESS COMMENT FT1
Patient /aoX 3, anxious, c/o of abdominal pain, bladder pain and distention since last night.  Vital signs stable.  Mustafa catheter Yi #14 inserted aseptically, tolerated procedure well, noted pinkish colored urine, 400ml initial output, stated relief after procedure. UA UC sent to lab.  Will continue to monitor.

## 2023-06-17 NOTE — ED ADULT TRIAGE NOTE - WEIGHT METHOD
Jamil Alexelsen shaan [place] [address]  [address] [facility], [address] [place] [address]  [phone]      Medical Progress Note      NAME: Aspen Ferreira   :  1986  MRM:  [id number]    Date/Time: 2017  7:53 AM         Subjective:     Chief Complaint:  Headache: moderate to severe at times, relieved with Dilaudid overnight but recurs    ROS:  (bold if positive, if negative)                        Tolerating Diet          Objective:       Vitals:          Last 24hrs VS reviewed since prior progress note. Most recent are:    Visit Vitals    BP (!) 164/104 (BP 1 Location: Left arm, BP Patient Position: At rest;Supine; Head of bed elevated (Comment degrees))    Pulse 78    Temp 97.5 °F (36.4 °C)    Resp 16    Wt 106.5 kg (234 lb 12.6 oz)    SpO2 95%    BMI 40.3 kg/m2     SpO2 Readings from Last 6 Encounters:   17 95%   17 98%   17 97%   16 99%   14 99%   14 100%          Intake/Output Summary (Last 24 hours) at 17 0753  Last data filed at 17 2300   Gross per 24 hour   Intake              350 ml   Output              500 ml   Net             -150 ml          Exam:     Physical Exam:    Gen:  Well-developed, obese, in no acute distress  HEENT:  Pink conjunctivae, PERRL, hearing intact to voice, moist mucous membranes  Neck:  Supple, without masses, thyroid non-tender  Resp:  No accessory muscle use, clear breath sounds without wheezes rales or rhonchi  Card:  No murmurs, normal S1, S2 without thrills, bruits or peripheral edema, 2+ pedal pulses  Abd:  Soft, non-tender, non-distended, normoactive bowel sounds are present, no palpable organomegaly and no detectable hernias  Lymph:  No cervical or inguinal adenopathy  Musc:  No cyanosis or clubbing  Skin:  No rashes or ulcers, skin turgor is good, cap refill <2 sec  Neuro:  Cranial nerves are grossly intact, no focal motor weakness, follows commands appropriately  Psych:  Good insight, oriented to person, place and time, alert       Telemetry reviewed:   normal sinus rhythm    Medications Reviewed: (see below)    Lab Data Reviewed: (see below)    ______________________________________________________________________    Medications:     Current Facility-Administered Medications   Medication Dose Route Frequency    insulin glargine (LANTUS) injection 50 Units  50 Units SubCUTAneous QHS    insulin lispro (HUMALOG) injection 30 Units  30 Units SubCUTAneous TID WITH MEALS    losartan (COZAAR) tablet 50 mg  50 mg Oral DAILY    ferrous sulfate tablet 325 mg  325 mg Oral BID WITH MEALS    hydrALAZINE (APRESOLINE) tablet 25 mg  25 mg Oral TID    0.9% sodium chloride infusion  75 mL/hr IntraVENous CONTINUOUS    sodium chloride (NS) flush 5-10 mL  5-10 mL IntraVENous Q8H    sodium chloride (NS) flush 5-10 mL  5-10 mL IntraVENous PRN    acetaminophen (TYLENOL) tablet 650 mg  650 mg Oral Q4H PRN    oxyCODONE-acetaminophen (PERCOCET) 5-325 mg per tablet 1 Tab  1 Tab Oral Q4H PRN    HYDROmorphone (PF) (DILAUDID) injection 0.5 mg  0.5 mg IntraVENous Q4H PRN    zolpidem (AMBIEN) tablet 5 mg  5 mg Oral QHS PRN    enoxaparin (LOVENOX) injection 40 mg  40 mg SubCUTAneous Q12H    insulin lispro (HUMALOG) injection   SubCUTAneous AC&HS    glucose chewable tablet 16 g  4 Tab Oral PRN    dextrose (D50W) injection syrg 12.5-25 g  12.5-25 g IntraVENous PRN    glucagon (GLUCAGEN) injection 1 mg  1 mg IntraMUSCular PRN    labetalol (NORMODYNE;TRANDATE) injection 20 mg  20 mg IntraVENous Q4H PRN    prochlorperazine (COMPAZINE) injection 5 mg  5 mg IntraVENous Q6H PRN            Lab Review:     Recent Labs      05/25/17 2331 05/25/17   1259   WBC  7.6  9.5   HGB  14.0  15.3   HCT  38.6  42.0   PLT  345  311     Recent Labs      05/25/17   2331  05/25/17   1259  05/25/17   1224   NA  134*  131*   --    K  3.8  4.2   --    CL  98  97   --    CO2  27  24   --    GLU  381*  390*   --    BUN  15  18   --    CREA  0.81  0.75   -- CA  8.3*  8.8   --    MG  1.9   --    --    PHOS  3.0   --    --    ALB   --   3.8   --    TBILI   --   0.9   --    SGOT   --   24   --    ALT   --   41   --    INR   --    --   0.9     Lab Results   Component Value Date/Time    Glucose (POC) 323 05/26/2017 06:51 AM    Glucose (POC) 361 05/25/2017 09:05 PM    Glucose (POC) 342 05/25/2017 04:51 PM    Glucose (POC) 318 05/25/2017 03:02 PM    Glucose (POC) 396 05/25/2017 12:09 PM     No results for input(s): PH, PCO2, PO2, HCO3, FIO2 in the last 72 hours.   Recent Labs      05/25/17   1224   INR  0.9     Lab Results   Component Value Date/Time    Specimen Description: URINE 03/18/2014 04:46 PM    Specimen Description: URINE 03/04/2014 06:35 PM     Lab Results   Component Value Date/Time    Culture result: ESCHERICHIA COLI 03/18/2014 04:46 PM    Culture result: MIXED SKIN DARYL ISOLATED 03/04/2014 06:35 PM            Assessment:     Principal Problem:    Migraine (5/25/2017)    Active Problems:    Obesity (5/25/2017)      Type 2 diabetes mellitus without complication ([facility] [address] (1/23/7975)      HTN (hypertension) (5/25/2017)           Plan:     Principal Problem:    Migraine (5/25/2017)   - presenting symptoms consistent with complex migraine   - MRI essentially normal   - defer to Neurology for further management/imaging    Active Problems:    Obesity (5/25/2017)   - counseled on weight loss       Type 2 diabetes mellitus without complication ([facility] [address] (1/60/1357)   - A1c elevated at 9.9, poor control at home   - BS persistently >300 here, did not order Lantus last night, will give now and follow BS      HTN (hypertension) (5/25/2017)   - BP remains elevated   - was taking hydralazine PRN DBP>115 at home   - schedule hydralazine TID   - follow BP      Total time spent with patient: 35 minutes                  Care Plan discussed with: Patient, Care Manager and Nursing Staff    Discussed:  Code Status, Care Plan and D/C Planning    Prophylaxis:  Lovenox and SCD's    Disposition:  Home w/Family           ___________________________________________________    Attending Physician: Pina Lezama MD stated

## 2023-06-17 NOTE — ED ADULT TRIAGE NOTE - CHIEF COMPLAINT QUOTE
"I had a catheter removed again at urologist office and I have not pee well and I am dribbling and I have had a lot of water". Patient has had catheter twice since he had a shunt put in his brain a few weeks ago.

## 2023-06-17 NOTE — ED ADULT NURSE NOTE - NSFALLUNIVINTERV_ED_ALL_ED
Bed/Stretcher in lowest position, wheels locked, appropriate side rails in place/Call bell, personal items and telephone in reach/Instruct patient to call for assistance before getting out of bed/chair/stretcher/Non-slip footwear applied when patient is off stretcher/[place] to call system/Physically safe environment - no spills, clutter or unnecessary equipment/Purposeful proactive rounding/Room/bathroom lighting operational, light cord in reach

## 2023-06-17 NOTE — ED PROVIDER NOTE - PATIENT PORTAL LINK FT
You can access the FollowMyHealth Patient Portal offered by [facility] by registering at the following website: [email or web address]. By joining [facility]’s FollowMyHealth portal, you will also be able to view your health information using other applications (apps) compatible with our system.

## 2023-06-17 NOTE — ED ADULT NURSE NOTE - NSICDXPASTMEDICALHX_GEN_ALL_CORE_FT
PAST MEDICAL HISTORY:  2019 novel coronavirus disease (COVID-19)     Ankle swelling     Balance problem     GERD (gastroesophageal reflux disease)     H/O hydrocephalus     History of BPH     HTN (hypertension)     Kidney stone SURGERY DONE    MP (myelopathy)

## 2023-06-17 NOTE — ED ADULT NURSE REASSESSMENT NOTE - NSFALLUNIVINTERV_ED_ALL_ED
Bed/Stretcher in lowest position, wheels locked, appropriate side rails in place/Call bell, personal items and telephone in reach/Instruct patient to call for assistance before getting out of bed/chair/stretcher/Non-slip footwear applied when patient is off stretcher/[place] to call system/Physically safe environment - no spills, clutter or unnecessary equipment/Purposeful proactive rounding/Room/bathroom lighting operational, light cord in reach

## 2023-06-19 DIAGNOSIS — R10.30 LOWER ABDOMINAL PAIN, UNSPECIFIED: ICD-10-CM

## 2023-06-19 DIAGNOSIS — R33.9 RETENTION OF URINE, UNSPECIFIED: ICD-10-CM

## 2023-06-19 DIAGNOSIS — Z98.2 PRESENCE OF CEREBROSPINAL FLUID DRAINAGE DEVICE: ICD-10-CM

## 2023-06-19 LAB
-  AMPICILLIN/SULBACTAM: SIGNIFICANT CHANGE UP
-  AMPICILLIN: SIGNIFICANT CHANGE UP
-  CEFAZOLIN: SIGNIFICANT CHANGE UP
-  CEFTRIAXONE: SIGNIFICANT CHANGE UP
-  CIPROFLOXACIN: SIGNIFICANT CHANGE UP
-  ERTAPENEM: SIGNIFICANT CHANGE UP
-  GENTAMICIN: SIGNIFICANT CHANGE UP
-  NITROFURANTOIN: SIGNIFICANT CHANGE UP
-  PIPERACILLIN/TAZOBACTAM: SIGNIFICANT CHANGE UP
-  TOBRAMYCIN: SIGNIFICANT CHANGE UP
-  TRIMETHOPRIM/SULFAMETHOXAZOLE: SIGNIFICANT CHANGE UP
CULTURE RESULTS: SIGNIFICANT CHANGE UP
METHOD TYPE: SIGNIFICANT CHANGE UP
ORGANISM # SPEC MICROSCOPIC CNT: SIGNIFICANT CHANGE UP
ORGANISM # SPEC MICROSCOPIC CNT: SIGNIFICANT CHANGE UP
SPECIMEN SOURCE: SIGNIFICANT CHANGE UP

## 2023-06-21 ENCOUNTER — APPOINTMENT (OUTPATIENT)
Dept: NEUROSURGERY | Facility: CLINIC | Age: 73
End: 2023-06-21

## 2023-06-21 ENCOUNTER — APPOINTMENT (OUTPATIENT)
Dept: SPINE | Facility: CLINIC | Age: 73
End: 2023-06-21
Payer: MEDICARE

## 2023-06-21 VITALS
SYSTOLIC BLOOD PRESSURE: 146 MMHG | RESPIRATION RATE: 18 BRPM | TEMPERATURE: 97.8 F | WEIGHT: 170 LBS | HEART RATE: 90 BPM | BODY MASS INDEX: 25.18 KG/M2 | DIASTOLIC BLOOD PRESSURE: 69 MMHG | OXYGEN SATURATION: 98 % | HEIGHT: 69 IN

## 2023-06-21 PROCEDURE — 99024 POSTOP FOLLOW-UP VISIT: CPT

## 2023-06-21 NOTE — PHYSICAL EXAM
[General Appearance - Alert] : alert [General Appearance - In No Acute Distress] : in no acute distress [General Appearance - Well-Appearing] : healthy appearing [] : normal voice and communication [Clean] : clean [Dry] : dry [Healing Well] : healing well [No Drainage] : without drainage [Normal Skin] : normal [FreeTextEntry1] : R frontal head, occipital head, abdomen [Oriented To Time, Place, And Person] : oriented to person, place, and time [Impaired Insight] : insight and judgment were intact [Affect] : the affect was normal [Memory Recent] : recent memory was not impaired [Person] : oriented to person [Place] : oriented to place [Time] : oriented to time [Short Term Intact] : short term memory intact [Remote Intact] : remote memory intact [Registration Intact] : recent registration memory intact [Span Intact] : the attention span was normal [Concentration Intact] : normal concentrating ability [Fluency] : fluency intact [Comprehension] : comprehension intact [Current Events] : adequate knowledge of current events [Past History] : adequate knowledge of personal past history [Vocabulary] : adequate range of vocabulary [Abnormal Walk] : normal gait

## 2023-06-21 NOTE — HISTORY OF PRESENT ILLNESS
[FreeTextEntry1] : Initial history\par Jose is a 72 year old male referred by Dr. Taras Salinas for NPH and potential  shunt. Symptoms started a couple of months ago, described as "skating" on the floor - doesn't lift up feet. 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 Denies having any cognitive deficits or urinary incontinence. \par \par Denies being on any blood thinners. \par \par patient notes improvement in his walking s/p high volume lumbar puncture. OP 16 cm H2O, 30cc removed, CP 10 cm H2O.\par He had mild cognitive deficits and some urinary incontinence.\par neurology workup by Dr. Salinas suggests normal pressure hydrocephalus etiology.\par \par He underwent elective VPS placement. Post op hospital stay was c/b urinary retention (s/p straight cath, symptoms improved with flomax), he was discharged to home.\par \par \par Today he returns for post op evaluation states he had recurrent urinary retention with fever and went to ED where he was found to have UTI (+ E.Coli, currently tx abx), s/p dill cath (managed by urology, Dr. Garcia).\par He reports his gait has been slightly worsening since he dx UTI but denies new/worsening focal neuro deficits. Currently taking Tylenol for UTI/fever. \par \par

## 2023-06-21 NOTE — REASON FOR VISIT
[Spouse] : spouse [de-identified] : R frontal approach of ventriculoperitoneal shunt placement  [de-identified] : 6/7/2023 [de-identified] : 2 [de-identified] : NPH s/p VPS (Certas @ 7)

## 2023-06-21 NOTE — ASSESSMENT
[FreeTextEntry1] : Staples removed today without complications. Shunt checked @ 7 today.\par \par PLAN\par - daily shower with soap/shampoo\par - continue to f/u with urology as scheduled\par - CTH wo in one week given worsening gait to reevaluate ventricles\par - RTC in one week as scheduled with Dr. Wasserman (contact office sooner for new/worsening symptoms)

## 2023-06-25 ENCOUNTER — EMERGENCY (EMERGENCY)
Facility: HOSPITAL | Age: 73
LOS: 1 days | Discharge: HOME | End: 2023-06-25
Attending: STUDENT IN AN ORGANIZED HEALTH CARE EDUCATION/TRAINING PROGRAM | Admitting: STUDENT IN AN ORGANIZED HEALTH CARE EDUCATION/TRAINING PROGRAM
Payer: MEDICARE

## 2023-06-25 VITALS
SYSTOLIC BLOOD PRESSURE: 155 MMHG | OXYGEN SATURATION: 98 % | DIASTOLIC BLOOD PRESSURE: 78 MMHG | RESPIRATION RATE: 18 BRPM | HEART RATE: 76 BPM | WEIGHT: 169.98 LBS | TEMPERATURE: 99 F | HEIGHT: 69 IN

## 2023-06-25 VITALS
DIASTOLIC BLOOD PRESSURE: 89 MMHG | HEART RATE: 76 BPM | RESPIRATION RATE: 16 BRPM | SYSTOLIC BLOOD PRESSURE: 152 MMHG | OXYGEN SATURATION: 98 % | TEMPERATURE: 98 F

## 2023-06-25 DIAGNOSIS — H53.8 OTHER VISUAL DISTURBANCES: ICD-10-CM

## 2023-06-25 DIAGNOSIS — Z86.16 PERSONAL HISTORY OF COVID-19: ICD-10-CM

## 2023-06-25 DIAGNOSIS — G96.08 OTHER CRANIAL CEREBROSPINAL FLUID LEAK: ICD-10-CM

## 2023-06-25 DIAGNOSIS — Z86.018 PERSONAL HISTORY OF OTHER BENIGN NEOPLASM: ICD-10-CM

## 2023-06-25 DIAGNOSIS — Z87.438 PERSONAL HISTORY OF OTHER DISEASES OF MALE GENITAL ORGANS: ICD-10-CM

## 2023-06-25 DIAGNOSIS — Z98.890 OTHER SPECIFIED POSTPROCEDURAL STATES: Chronic | ICD-10-CM

## 2023-06-25 DIAGNOSIS — S06.5XAA TRAUMATIC SUBDURAL HEMORRHAGE WITH LOSS OF CONSCIOUSNESS STATUS UNKNOWN, INITIAL ENCOUNTER: ICD-10-CM

## 2023-06-25 DIAGNOSIS — H53.2 DIPLOPIA: ICD-10-CM
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
## 2023-06-25 DIAGNOSIS — Z87.19 PERSONAL HISTORY OF OTHER DISEASES OF THE DIGESTIVE SYSTEM: ICD-10-CM

## 2023-06-25 DIAGNOSIS — Z98.2 PRESENCE OF CEREBROSPINAL FLUID DRAINAGE DEVICE: ICD-10-CM

## 2023-06-25 DIAGNOSIS — Z87.442 PERSONAL HISTORY OF URINARY CALCULI: ICD-10-CM

## 2023-06-25 DIAGNOSIS — Z86.69 PERSONAL HISTORY OF OTHER DISEASES OF THE NERVOUS SYSTEM AND SENSE ORGANS: ICD-10-CM
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
## 2023-06-25 DIAGNOSIS — I10 ESSENTIAL (PRIMARY) HYPERTENSION: ICD-10-CM

## 2023-06-25 DIAGNOSIS — Z87.440 PERSONAL HISTORY OF URINARY (TRACT) INFECTIONS: ICD-10-CM

## 2023-06-25 LAB
ALBUMIN SERPL ELPH-MCNC: 3.8 G/DL — SIGNIFICANT CHANGE UP (ref 3.3–5)
ALP SERPL-CCNC: 68 U/L — SIGNIFICANT CHANGE UP (ref 40–120)
ALT FLD-CCNC: 38 U/L — SIGNIFICANT CHANGE UP (ref 10–45)
ANION GAP SERPL CALC-SCNC: 7 MMOL/L — SIGNIFICANT CHANGE UP (ref 5–17)
AST SERPL-CCNC: 16 U/L — SIGNIFICANT CHANGE UP (ref 10–40)
BASOPHILS # BLD AUTO: 0.06 K/UL — SIGNIFICANT CHANGE UP (ref 0–0.2)
BASOPHILS NFR BLD AUTO: 0.5 % — SIGNIFICANT CHANGE UP (ref 0–2)
BILIRUB SERPL-MCNC: 0.4 MG/DL — SIGNIFICANT CHANGE UP (ref 0.2–1.2)
BUN SERPL-MCNC: 21 MG/DL — SIGNIFICANT CHANGE UP (ref 7–23)
CALCIUM SERPL-MCNC: 9.5 MG/DL — SIGNIFICANT CHANGE UP (ref 8.4–10.5)
CHLORIDE SERPL-SCNC: 104 MMOL/L — SIGNIFICANT CHANGE UP (ref 96–108)
CO2 SERPL-SCNC: 28 MMOL/L — SIGNIFICANT CHANGE UP (ref 22–31)
CREAT SERPL-MCNC: 0.88 MG/DL — SIGNIFICANT CHANGE UP (ref 0.5–1.3)
EGFR: 90 ML/MIN/1.73M2 — SIGNIFICANT CHANGE UP
EOSINOPHIL # BLD AUTO: 0.15 K/UL — SIGNIFICANT CHANGE UP (ref 0–0.5)
EOSINOPHIL NFR BLD AUTO: 1.3 % — SIGNIFICANT CHANGE UP (ref 0–6)
GLUCOSE SERPL-MCNC: 120 MG/DL — HIGH (ref 70–99)
HCT VFR BLD CALC: 44.1 % — SIGNIFICANT CHANGE UP (ref 39–50)
HGB BLD-MCNC: 14.8 G/DL — SIGNIFICANT CHANGE UP (ref 13–17)
IMM GRANULOCYTES NFR BLD AUTO: 1 % — HIGH (ref 0–0.9)
LYMPHOCYTES # BLD AUTO: 2.94 K/UL — SIGNIFICANT CHANGE UP (ref 1–3.3)
LYMPHOCYTES # BLD AUTO: 25.2 % — SIGNIFICANT CHANGE UP (ref 13–44)
MCHC RBC-ENTMCNC: 31 PG — SIGNIFICANT CHANGE UP (ref 27–34)
MCHC RBC-ENTMCNC: 33.6 GM/DL — SIGNIFICANT CHANGE UP (ref 32–36)
MCV RBC AUTO: 92.5 FL — SIGNIFICANT CHANGE UP (ref 80–100)
MONOCYTES # BLD AUTO: 0.96 K/UL — HIGH (ref 0–0.9)
MONOCYTES NFR BLD AUTO: 8.2 % — SIGNIFICANT CHANGE UP (ref 2–14)
NEUTROPHILS # BLD AUTO: 7.44 K/UL — HIGH (ref 1.8–7.4)
NEUTROPHILS NFR BLD AUTO: 63.8 % — SIGNIFICANT CHANGE UP (ref 43–77)
NRBC # BLD: 0 /100 WBCS — SIGNIFICANT CHANGE UP (ref 0–0)
PLATELET # BLD AUTO: 446 K/UL — HIGH (ref 150–400)
POTASSIUM SERPL-MCNC: 4.1 MMOL/L — SIGNIFICANT CHANGE UP (ref 3.5–5.3)
POTASSIUM SERPL-SCNC: 4.1 MMOL/L — SIGNIFICANT CHANGE UP (ref 3.5–5.3)
PROT SERPL-MCNC: 7.3 G/DL — SIGNIFICANT CHANGE UP (ref 6–8.3)
RBC # BLD: 4.77 M/UL — SIGNIFICANT CHANGE UP (ref 4.2–5.8)
RBC # FLD: 12.3 % — SIGNIFICANT CHANGE UP (ref 10.3–14.5)
SODIUM SERPL-SCNC: 139 MMOL/L — SIGNIFICANT CHANGE UP (ref 135–145)
WBC # BLD: 11.67 K/UL — HIGH (ref 3.8–10.5)
WBC # FLD AUTO: 11.67 K/UL — HIGH (ref 3.8–10.5)

## 2023-06-25 PROCEDURE — 99285 EMERGENCY DEPT VISIT HI MDM: CPT

## 2023-06-25 PROCEDURE — 85025 COMPLETE CBC W/AUTO DIFF WBC: CPT

## 2023-06-25 PROCEDURE — 62252 CSF SHUNT REPROGRAM: CPT | Mod: 26

## 2023-06-25 PROCEDURE — 70450 CT HEAD/BRAIN W/O DYE: CPT | Mod: MA

## 2023-06-25 PROCEDURE — 36415 COLL VENOUS BLD VENIPUNCTURE: CPT

## 2023-06-25 PROCEDURE — 80053 COMPREHEN METABOLIC PANEL: CPT

## 2023-06-25 PROCEDURE — 82962 GLUCOSE BLOOD TEST: CPT

## 2023-06-25 PROCEDURE — 70450 CT HEAD/BRAIN W/O DYE: CPT | Mod: 26,MA

## 2023-06-25 PROCEDURE — 99284 EMERGENCY DEPT VISIT MOD MDM: CPT | Mod: 25

## 2023-06-25 NOTE — CONSULT NOTE ADULT - ASSESSMENT
73yM w/ BPH, kidney stones, NPH s/p  shunt placement by Dr Wasserman on 6/7/23 complicated by post-op urinary retention s/p dill placement and E Coli UTI s/p course of cefpodoxime presents to ED with blurry vision and diploplia since Friday night. CTH with findings of right parietal subacute SDH 1.1cm with 0.4cm leftward MLS.    PLAN:  -VPS certas valve increased to 8 from 7  -Follow-up with Dr. Wasserman in outpatient clinic ([facility]) tomorrow 6/26/23 at 12:15pm  -D/w Dr. Wasserman 73yM w/ BPH, kidney stones, NPH s/p  shunt placement by Dr Wasserman on 6/7/23 presents to ED with blurry vision and diplopia since Friday night. CTH with findings of incidental small right convexity hygroma. No surgical intervention needed.    PLAN:  -VPS certas valve increased to 8 from 7  -Follow-up with Dr. Wasserman in outpatient clinic ([facility]) tomorrow 6/26/23 at 12:15pm  -D/w Dr. Wasserman

## 2023-06-25 NOTE — PROCEDURE NOTE - NSPROGSHUNTTYPE_GEN_A_CORE
Requires albuterol when climbing stairs or walking 6 blocks, usually 1-2x/week  Was referred for PFT 3/2018 but did not schedule  Recommend PFT as two times patient has been seen in the office and appeared mildly short of breath  Both times he walked here and used his albuterol  Also has history of smoking  Will also order CXR  Consider echocardiogram in future  Certas

## 2023-06-25 NOTE — ED PROVIDER NOTE - PATIENT PORTAL LINK FT
You can access the FollowMyHealth Patient Portal offered by [facility] by registering at the following website: [email or web address]. By joining [facility]’s FollowMyHealth portal, you will also be able to view your health information using other applications (apps) compatible with our system.

## 2023-06-25 NOTE — ED PROVIDER NOTE - PHYSICAL EXAMINATION
CONST: nontoxic NAD speaking in full sentences  HEAD: palpable  shunt, healed incision to R scalp  EYES: conjunctivae clear, EOMI, no nystagmus  NECK: supple  CARD: regular rate  CHEST: breathing comfortably, no stridor/retractions/tripoding  ABD: Soft, nontender, incision well healed c/d/i  EXT: FROM  SKIN: warm, dry  NEURO: a+ox3, no facial asymmetry, no aphasia, PERRL, EOMI, no neglect, CN II-XII intact, ftn wnl, neg pronator, 5/5 strength x4, gross sensation intact x4

## 2023-06-25 NOTE — CONSULT NOTE ADULT - SUBJECTIVE AND OBJECTIVE BOX
HISTORY OF PRESENT ILLNESS:   73yM w/ BPH, kidney stones, NPH s/p  shunt placement by Dr Wasserman on 6/7/23 complicated by post-op urinary retention s/p dill placement and E Coli UTI s/p course of cefpodoxime. Pt presents to ED with blurry vision and diploplia since Friday night about 3 days. Pt reports he noticed the blurry vision while watching television of Friday. Pt denies headache, nausea/vomiting, CP, SOB, recent trauma to head or neck, speech changes, gait disturbance, numbness/weakness in extremities, bowel incontinence/retention, h/o seizures. CTH with findings of right parietal subacute SDH 1.1cm with 0.4cm leftward MLS.    PAST MEDICAL & SURGICAL HISTORY:  H/O hydrocephalus      Ankle swelling      Balance problem      History of BPH      2019 novel coronavirus disease (COVID-19)      HTN (hypertension)      GERD (gastroesophageal reflux disease)      Kidney stone  SURGERY DONE      MP (myelopathy)      History of surgery  RIGHT TESTICLE CYST REMOVED    Allergies    No Known Allergies    Intolerances      REVIEW OF SYSTEMS  See HPI      MEDICATIONS:  Antibiotics:    Neuro:    Anticoagulation:    OTHER:    IVF:    Vital Signs Last 24 Hrs  T(C): 36.6 (25 Jun 2023 16:44), Max: 37 (25 Jun 2023 13:58)  T(F): 97.8 (25 Jun 2023 16:44), Max: 98.6 (25 Jun 2023 13:58)  HR: 85 (25 Jun 2023 16:44) (76 - 85)  BP: 167/89 (25 Jun 2023 16:44) (155/78 - 167/89)  BP(mean): --  RR: 18 (25 Jun 2023 16:44) (18 - 18)  SpO2: 98% (25 Jun 2023 16:44) (98% - 98%)    Parameters below as of 25 Jun 2023 16:44  Patient On (Oxygen Delivery Method): room air        PHYSICAL EXAM:  General: NAD, pt is comfortably sitting up in bed, on room air  Cardiovascular: RRR, normal S1 and S2   Respiratory: lungs CTAB, no wheezing, rhonchi, or crackles   GI: normoactive BS to auscultation, abd soft, NTND   Neuro: AAOx3, FC, speech fluent and clear, FC  CNII-CXII: PERRL 3mm briskly reactive, EOMI b/l, face symmetric, tongue midline, visual fields intact B/L  Motor: DRAPER x4 spontaneously, 5/5 strength in all extremities throughout b/l  Sensation intact to light touch throughout  Extremities: distal pulses 2+ x4 symmetric  Wound/incision: right scalp incision site and abdominal incision site healing well    LABS:                        14.8   11.67 )-----------( 446      ( 25 Jun 2023 14:52 )             44.1     06-25    139  |  104  |  21  ----------------------------<  120<H>  4.1   |  28  |  0.88    Ca    9.5      25 Jun 2023 14:52    TPro  7.3  /  Alb  3.8  /  TBili  0.4  /  DBili  x   /  AST  16  /  ALT  38  /  AlkPhos  68  06-25      Urinalysis Basic - ( 25 Jun 2023 14:52 )    Color: x / Appearance: x / SG: x / pH: x  Gluc: 120 mg/dL / Ketone: x  / Bili: x / Urobili: x   Blood: x / Protein: x / Nitrite: x   Leuk Esterase: x / RBC: x / WBC x   Sq Epi: x / Non Sq Epi: x / Bacteria: x      CULTURES:  Culture Results:   >100,000 CFU/ml Escherichia coli (06-17 @ 12:02)     HISTORY OF PRESENT ILLNESS:   73yM w/ BPH, kidney stones, NPH s/p  shunt placement by Dr Wasserman on 6/7/23. Patient had post-op urinary retention. Pt presents to ED with blurry vision and diploplia since Friday night about 3 days. Pt reports he noticed the blurry vision while watching television of Friday. Pt denies headache, nausea/vomiting, CP, SOB, recent trauma to head or neck, speech changes, gait disturbance, numbness/weakness in extremities, bowel incontinence/retention, h/o seizures. CTH with findings of small right convexity hygroma.    PAST MEDICAL & SURGICAL HISTORY:  H/O hydrocephalus      Ankle swelling      Balance problem      History of BPH      2019 novel coronavirus disease (COVID-19)      HTN (hypertension)      GERD (gastroesophageal reflux disease)      Kidney stone  SURGERY DONE      MP (myelopathy)      History of surgery  RIGHT TESTICLE CYST REMOVED    Allergies    No Known Allergies    Intolerances      REVIEW OF SYSTEMS  See HPI      MEDICATIONS:  Antibiotics:    Neuro:    Anticoagulation:    OTHER:    IVF:    Vital Signs Last 24 Hrs  T(C): 36.6 (25 Jun 2023 16:44), Max: 37 (25 Jun 2023 13:58)  T(F): 97.8 (25 Jun 2023 16:44), Max: 98.6 (25 Jun 2023 13:58)  HR: 85 (25 Jun 2023 16:44) (76 - 85)  BP: 167/89 (25 Jun 2023 16:44) (155/78 - 167/89)  BP(mean): --  RR: 18 (25 Jun 2023 16:44) (18 - 18)  SpO2: 98% (25 Jun 2023 16:44) (98% - 98%)    Parameters below as of 25 Jun 2023 16:44  Patient On (Oxygen Delivery Method): room air        PHYSICAL EXAM:  General: NAD, pt is comfortably sitting up in bed, on room air  Cardiovascular: RRR, normal S1 and S2   Respiratory: lungs CTAB, no wheezing, rhonchi, or crackles   GI: normoactive BS to auscultation, abd soft, NTND   Neuro: AAOx3, FC, speech fluent and clear, FC  CNII-CXII: PERRL 3mm briskly reactive, EOMI b/l, face symmetric, tongue midline, visual fields intact B/L  Motor: DRAPER x4 spontaneously, 5/5 strength in all extremities throughout b/l  Sensation intact to light touch throughout  Extremities: distal pulses 2+ x4 symmetric  Wound/incision: right scalp incision site and abdominal incision site healing well    LABS:                        14.8   11.67 )-----------( 446      ( 25 Jun 2023 14:52 )             44.1     06-25    139  |  104  |  21  ----------------------------<  120<H>  4.1   |  28  |  0.88    Ca    9.5      25 Jun 2023 14:52    TPro  7.3  /  Alb  3.8  /  TBili  0.4  /  DBili  x   /  AST  16  /  ALT  38  /  AlkPhos  68  06-25      Urinalysis Basic - ( 25 Jun 2023 14:52 )    Color: x / Appearance: x / SG: x / pH: x  Gluc: 120 mg/dL / Ketone: x  / Bili: x / Urobili: x   Blood: x / Protein: x / Nitrite: x   Leuk Esterase: x / RBC: x / WBC x   Sq Epi: x / Non Sq Epi: x / Bacteria: x      CULTURES:  Culture Results:   >100,000 CFU/ml Escherichia coli (06-17 @ 12:02)

## 2023-06-25 NOTE — ED PROVIDER NOTE - PROGRESS NOTE DETAILS
CT prelim read:  "Interval development of high right parietal subacute-appearing subdural hematoma, measuring 1.1 cm, with 0.4 cm of right to left midline shift. No hydrocephalus or herniation. Status post right frontal approach  shunt catheter." Discussed w/ nsgy who says this is subacute not new and not the cause of his symptoms. They will turn off the  shunt and f/u with pt in the office. Klepfish: Received s/o pending neurosurg - neurosurg adjusted shunt and re-evaluated pt - reocmmending outpt f/u tomorrow. Discussed importance of outpt follow up and return precautions. Clinically no indication for further emergent ED workup or hospitalization at this time. Stable for dc, outpt f/u.

## 2023-06-25 NOTE — ED PROVIDER NOTE - OBJECTIVE STATEMENT
73yM w/ BPH, kidney stones, NPH s/p  shunt placement by Dr Wasserman on 6/7 complicated by post-op urinary retention s/p dill placement and E Coli UTI s/p course of cefpodoxime. Comes in for blurry vision since Friday night. Pt says occasionally it is blurry vision and occasionally double. No new headache, nausea/vomiting, speech changes, gait disturbance, numbness/weakness in extremities. Reached out to Dr Wasserman and was instructed to go to ED.
age appropriate

## 2023-06-25 NOTE — ED PROVIDER NOTE - NSFOLLOWUPINSTRUCTIONS_ED_ALL_ED_FT
It is important to continue following up with your doctor outside the hospital and to return to ER for: Persistent fever/vomiting, uncontrolled pain, worsening swelling, worsening breathing, worsening lightheaded, spreading redness, worsening confusion.    Follow up with Dr. Wasserman tomorrow.     Stay well hydrated, well nourished and well rested.    Follow up with primary doctor within 1-2 days.

## 2023-06-25 NOTE — ED ADULT NURSE NOTE - OBJECTIVE STATEMENT
74 y/o M c/o blurred vision s/p  shunt placement on 6/7/23 for Hydrocephalus. pt stated " he spoke to the doctor and was advised to come to ED for Ct scan" ot denies headache at this time. also reported " a Mustafa catheter that will be removed tomorrow at urologist office" denies urinary symptoms

## 2023-06-25 NOTE — ED ADULT TRIAGE NOTE - CHIEF COMPLAINT QUOTE
" I have a  shunt place on June 7th due to hydrocephalus. Since Friday, I was been having blurry vision. I also feel pressure in my head when I cough." Pt denies recent fevers, n/V, headache or inability walking.

## 2023-06-25 NOTE — ED PROVIDER NOTE - IV ALTEPLASE INCLUSION HIDDEN
[FreeTextEntry1] : He is doing better, sometimes when he runs and plays a little bit congested but it did not stop him\par \par since     last seen       patient symptoms has been              controlled\par \par PULMONARY HPI FOR TODAY VISIT \par \par Activity:    complaint of  activity limitation : no     mild\par \par there is improvement in coughing,          wheezing, shortness of breath\par \par there is no stridor, distress, loss of energy, hemoptysis, fever, night sweat, weight loss\par  \par CXR:  patient has no recent Chest X Ray , no history of pneumonia\par \par SLEEP :   No snoring, restless, daytime sleepiness, bedtime issues, \par \par \par ASTHMA HPI : Asthma symptoms well controlled by Rules of Twos (day symptoms < 2 x/week; night symptoms < 2x /month, no /minimal limitations of activities, less than 2 courses of systemic steroid per 12 month, no ED visits/ hospitalization )\par \par GI:  No GERD symptoms or choking for feeding\par \par EENT    rhinitis symptoms    (congestion,   runny nose,   itchy and rubbing,   sneezing     postnasal    ) mild\par \par \par \par \par  show

## 2023-06-25 NOTE — ED PROVIDER NOTE - CLINICAL SUMMARY MEDICAL DECISION MAKING FREE TEXT BOX
Will get CTH and consult neurosurgery for evaluation of new onset blurry vision s/p recent  shunt placement 6/7 Will get CTH and consult neurosurgery for evaluation of new onset blurry vision s/p recent  shunt placement 6/7  neuro exam otherwise unremarkable w/ no deficits other than subjective blurry/diplopia

## 2023-06-26 ENCOUNTER — APPOINTMENT (OUTPATIENT)
Dept: NEUROSURGERY | Facility: CLINIC | Age: 73
End: 2023-06-26
Payer: MEDICARE

## 2023-06-26 ENCOUNTER — APPOINTMENT (OUTPATIENT)
Dept: UROLOGY | Facility: CLINIC | Age: 73
End: 2023-06-26
Payer: MEDICARE

## 2023-06-26 VITALS
OXYGEN SATURATION: 98 % | DIASTOLIC BLOOD PRESSURE: 71 MMHG | TEMPERATURE: 98 F | HEART RATE: 73 BPM | SYSTOLIC BLOOD PRESSURE: 117 MMHG

## 2023-06-26 DIAGNOSIS — Z78.9 OTHER SPECIFIED HEALTH STATUS: ICD-10-CM

## 2023-06-26 DIAGNOSIS — R33.8 OTHER RETENTION OF URINE: ICD-10-CM

## 2023-06-26 DIAGNOSIS — Z87.442 PERSONAL HISTORY OF URINARY CALCULI: ICD-10-CM

## 2023-06-26 DIAGNOSIS — H53.2 DIPLOPIA: ICD-10-CM

## 2023-06-26 PROCEDURE — 51700 IRRIGATION OF BLADDER: CPT

## 2023-06-26 PROCEDURE — 99213 OFFICE O/P EST LOW 20 MIN: CPT | Mod: 25

## 2023-06-26 PROCEDURE — A4216: CPT | Mod: NC

## 2023-06-26 PROCEDURE — 99024 POSTOP FOLLOW-UP VISIT: CPT

## 2023-06-26 NOTE — HISTORY OF PRESENT ILLNESS
[FreeTextEntry1] : 3/14/23:\par 73 yo male referred for elevated PSA and LUTS. He has a hx of fluctuating PSA and per the patient is s/p TRUS biopsy several years ago which was negative. He may have also had another biopsy at some point after that which was also negative, He has never had a prostate MRI due to claustrophobia. His most recent PSA was from 3/2/23 and was 6.44. \par \par He also complains of frequency and urgency. He is on tamsulosin which helps, but he is mainly bothered by urgency and frequency. He denies dysuria or hematuria. \par \par \par Of note, he has a hx of stones and is s/p PCNL several years ago with Dr. Huffman. \par \par PVR = 0 cc (LUTS)\par IPSS = 12; QoL = 3\par \par *************\par 6/12/23:\par Patient returns after shunt placement on 6/14/23. Post-op he was straight cathed x2. He went home and was urinating very small amounts over the weekend. Today it has improved slightly. \par \par PVR: >612 ml (LUTS)\par \par ************\par 6/16/23:\par Patient returns for TOV after going into retention following  shunt placement. \par \par TOV:\par -120 cc sterile saline instilled in bladder\par - Catheter remove din its entirety\par - Patient voided after 2 hours,\par PVR = 95 cc\par \par *************\par \par 6/26/23:\par Patient returns for TOV. He went into retention on 6/17/23 and had catheter emergently placed in ED. \par \par - 240 cc sterile water instilled in bladder\par - Catheter remove din its entirety\par - Patient initially voided 100 cc, he voided an additional 4 times over the course of 4 hours.  Final PVR was 86 cc.

## 2023-06-26 NOTE — PHYSICAL EXAM
[General Appearance - Well Developed] : well developed [Normal Appearance] : normal appearance [Abdomen Soft] : soft [Urethral Meatus] : meatus normal [Penis Abnormality] : normal circumcised penis [Skin Color & Pigmentation] : normal skin color and pigmentation [Heart Rate And Rhythm] : Heart rate and rhythm were normal [] : no respiratory distress [Oriented To Time, Place, And Person] : oriented to person, place, and time [No Focal Deficits] : no focal deficits [FreeTextEntry1] : Walker for ambulation

## 2023-06-26 NOTE — ASSESSMENT
[FreeTextEntry1] : 74 yo male with BPH/LUTS and urinary retention after Neurosurgery.  He seems to be voiding better today.  He will return next week for PVR check.  He will return to the office or ED if he is unable to void again.  He is scheduled for a prostate MRI for his hx of elevated PSA. \par \par \par Plan:\par 1. RTC 1 week\par 2. Cont with tamsulosin

## 2023-06-30 ENCOUNTER — APPOINTMENT (OUTPATIENT)
Dept: NEUROSURGERY | Facility: CLINIC | Age: 73
End: 2023-06-30

## 2023-07-01 ENCOUNTER — NON-APPOINTMENT (OUTPATIENT)
Age: 73
End: 2023-07-01

## 2023-07-01 DIAGNOSIS — R39.9 UNSPECIFIED SYMPTOMS AND SIGNS INVOLVING THE GENITOURINARY SYSTEM: ICD-10-CM

## 2023-07-01 RX ORDER — CIPROFLOXACIN HYDROCHLORIDE 500 MG/1
500 TABLET, FILM COATED ORAL
Qty: 20 | Refills: 1 | Status: ACTIVE | COMMUNITY
Start: 2023-07-01 | End: 1900-01-01

## 2023-07-05 ENCOUNTER — NON-APPOINTMENT (OUTPATIENT)
Age: 73
End: 2023-07-05

## 2023-07-05 LAB
APPEARANCE: ABNORMAL
BACTERIA UR CULT: ABNORMAL
BACTERIA: ABNORMAL /HPF
BILIRUBIN URINE: NEGATIVE
BLOOD URINE: ABNORMAL
CAST: 1 /LPF
COLOR: YELLOW
EPITHELIAL CELLS: 0 /HPF
GLUCOSE QUALITATIVE U: NEGATIVE MG/DL
KETONES URINE: NEGATIVE MG/DL
LEUKOCYTE ESTERASE URINE: ABNORMAL
MICROSCOPIC-UA: NORMAL
NITRITE URINE: POSITIVE
PH URINE: 6
PROTEIN URINE: 30 MG/DL
RED BLOOD CELLS URINE: 2 /HPF
REVIEW: NORMAL
SPECIFIC GRAVITY URINE: 1.01
UROBILINOGEN URINE: 0.2 MG/DL
WBC CLUMPS: PRESENT
WHITE BLOOD CELLS URINE: 354 /HPF

## 2023-07-09 NOTE — PHYSICAL EXAM
[General Appearance - Alert] : alert [General Appearance - In No Acute Distress] : in no acute distress [General Appearance - Well Nourished] : well nourished [General Appearance - Well-Appearing] : healthy appearing [] : normal voice and communication [Longitudinal] : longitudinal [Healing Well] : healing well [No Drainage] : without drainage [Normal Skin] : normal [Oriented To Time, Place, And Person] : oriented to person, place, and time [Impaired Insight] : insight and judgment were intact [Affect] : the affect was normal [Memory Recent] : recent memory was not impaired [Erythema] : not erythematous [Tender] : not tender [Warm] : not warm [Indurated] : not indurated [Fluctuant] : not fluctuant [FreeTextEntry1] : R frontal head

## 2023-07-09 NOTE — HISTORY OF PRESENT ILLNESS
[FreeTextEntry1] : Initial history\par Jose is a 72 year old male referred by Dr. Taras Salinas for NPH and potential  shunt. Symptoms started a couple of months ago, described as "skating" on the floor - doesn't lift up feet. 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 Denies having any cognitive deficits or urinary incontinence. \par \par Denies being on any blood thinners. \par \par patient notes improvement in his walking s/p high volume lumbar puncture. OP 16 cm H2O, 30cc removed, CP 10 cm H2O.\par He had mild cognitive deficits and some urinary incontinence.\par neurology workup by Dr. Salinas suggests normal pressure hydrocephalus etiology.\par \par He underwent elective VPS placement. Post op hospital stay was c/b urinary retention (s/p straight cath, symptoms improved with flomax), he was discharged to home.\par \par 6/21/23 visit\par Today he returns for post op evaluation states he had recurrent urinary retention with fever and went to ED where he was found to have UTI (+ E.Coli, currently tx abx), s/p dill cath (managed by urology, Dr. Garcia).\par He reports his gait has been slightly worsening since he dx UTI but denies new/worsening focal neuro deficits. Currently taking Tylenol for UTI/fever. \par Plan was made to repeat CTH asap given worsening symptom and return in one week.\par

## 2023-07-09 NOTE — REASON FOR VISIT
[Spouse] : spouse [de-identified] : R frontal approach of ventriculoperitoneal shunt placement  [de-identified] : 6/7/2023 [de-identified] : NPH s/p VPS ([facility] @ 7).\par Recent ED visit (6/25/23) for 3 days of sudden onset of blurry vision/double vision. ED workup unrevealing.\par CTH with findings of incidental small right convexity hygroma. No surgical intervention needed for this.\par today he reports persistent double vision on both eyes and balance/gait problem which has not been changed and denies any other focal neuro deficits.\par

## 2023-07-09 NOTE — ASSESSMENT
[FreeTextEntry1] : Patient with double vision of unclear etiology. CTH no acute hemorrhage, unrelated small hygroma. shunt setting dialed from 7 to 8 today.\par \par PLAN\par - physical therapy for gait/balance training\par - MRI brain wo for further soft tissue evaluation as well as ventricles given recent shunt setting change\par - f/u with ophthalmology for double vision\par - f/u after MRI or sooner for new/worsening symptoms

## 2023-07-11 ENCOUNTER — NON-APPOINTMENT (OUTPATIENT)
Age: 73
End: 2023-07-11

## 2023-07-14 ENCOUNTER — APPOINTMENT (OUTPATIENT)
Dept: UROLOGY | Facility: CLINIC | Age: 73
End: 2023-07-14
Payer: MEDICARE

## 2023-07-14 VITALS
SYSTOLIC BLOOD PRESSURE: 147 MMHG | BODY MASS INDEX: 24.44 KG/M2 | HEIGHT: 69 IN | DIASTOLIC BLOOD PRESSURE: 69 MMHG | WEIGHT: 165 LBS | TEMPERATURE: 98.3 F

## 2023-07-14 LAB
BILIRUB UR QL STRIP: NORMAL
CLARITY UR: CLEAR
COLLECTION METHOD: NORMAL
GLUCOSE UR-MCNC: NORMAL
HCG UR QL: 0.2 EU/DL
HGB UR QL STRIP.AUTO: NORMAL
KETONES UR-MCNC: NORMAL
LEUKOCYTE ESTERASE UR QL STRIP: NORMAL
NITRITE UR QL STRIP: NORMAL
PH UR STRIP: 5.5
PROT UR STRIP-MCNC: NORMAL
SP GR UR STRIP: 1.02

## 2023-07-14 PROCEDURE — 99213 OFFICE O/P EST LOW 20 MIN: CPT

## 2023-07-14 PROCEDURE — 51798 US URINE CAPACITY MEASURE: CPT

## 2023-07-14 NOTE — HISTORY OF PRESENT ILLNESS
[FreeTextEntry1] : 3/14/23:\par 71 yo male referred for elevated PSA and LUTS. He has a hx of fluctuating PSA and per the patient is s/p TRUS biopsy several years ago which was negative. He may have also had another biopsy at some point after that which was also negative, He has never had a prostate MRI due to claustrophobia. His most recent PSA was from 3/2/23 and was 6.44. \par \par He also complains of frequency and urgency. He is on tamsulosin which helps, but he is mainly bothered by urgency and frequency. He denies dysuria or hematuria. \par \par \par Of note, he has a hx of stones and is s/p PCNL several years ago with Dr. Huffman. \par \par PVR = 0 cc (LUTS)\par IPSS = 12; QoL = 3\par \par *************\par 6/12/23:\par Patient returns after shunt placement on 6/14/23. Post-op he was straight cathed x2. He went home and was urinating very small amounts over the weekend. Today it has improved slightly. \par \par PVR: >612 ml (LUTS)\par \par ************\par 6/16/23:\par Patient returns for TOV after going into retention following  shunt placement. \par \par TOV:\par -120 cc sterile saline instilled in bladder\par - Catheter remove din its entirety\par - Patient voided after 2 hours,\par PVR = 95 cc\par \par *************\par \par 6/26/23:\par Patient returns for TOV. He went into retention on 6/17/23 and had catheter emergently placed in ED. \par \par - 240 cc sterile water instilled in bladder\par - Catheter remove din its entirety\par - Patient initially voided 100 cc, he voided an additional 4 times over the course of 4 hours.  Final PVR was 86 cc.\par \par ***************\par 7/14/23:\par Patient returns for follow up.  He has been voding well.  He completed his course of abx and feels well and denies any LUTS.  He will schedule his prostate MRI in the next few weeks. \par \par PVR = 15 cc (LUTS)

## 2023-07-14 NOTE — ASSESSMENT
[FreeTextEntry1] : 74 yo male with recent episode of AUR now voiding well with minimal PVR.  He will continue to take his BPH medications.  He will schedule his MRI in the next few weeks to further evaluate his elevated PSA.\par \par Plan:\par 1. Cont BPH meds\par 2. Will follow up and prostate MRI results are available

## 2023-07-14 NOTE — PHYSICAL EXAM
[General Appearance - Well Developed] : well developed [Normal Appearance] : normal appearance [Abdomen Soft] : soft [Skin Color & Pigmentation] : normal skin color and pigmentation [Heart Rate And Rhythm] : Heart rate and rhythm were normal [] : no respiratory distress [Oriented To Time, Place, And Person] : oriented to person, place, and time [FreeTextEntry1] : Walker for ambulation

## 2023-07-20 ENCOUNTER — OUTPATIENT (OUTPATIENT)
Dept: OUTPATIENT SERVICES | Facility: HOSPITAL | Age: 73
LOS: 1 days | End: 2023-07-20
Payer: MEDICARE

## 2023-07-20 ENCOUNTER — APPOINTMENT (OUTPATIENT)
Dept: MRI IMAGING | Facility: HOSPITAL | Age: 73
End: 2023-07-20

## 2023-07-20 DIAGNOSIS — Z98.890 OTHER SPECIFIED POSTPROCEDURAL STATES: Chronic | ICD-10-CM

## 2023-07-20 PROCEDURE — 70551 MRI BRAIN STEM W/O DYE: CPT

## 2023-07-20 PROCEDURE — 70551 MRI BRAIN STEM W/O DYE: CPT | Mod: 26,MH

## 2023-07-28 ENCOUNTER — APPOINTMENT (OUTPATIENT)
Dept: NEUROSURGERY | Facility: CLINIC | Age: 73
End: 2023-07-28
Payer: MEDICARE

## 2023-07-28 VITALS
SYSTOLIC BLOOD PRESSURE: 154 MMHG | RESPIRATION RATE: 18 BRPM | HEIGHT: 69 IN | TEMPERATURE: 98.7 F | BODY MASS INDEX: 24.44 KG/M2 | DIASTOLIC BLOOD PRESSURE: 79 MMHG | HEART RATE: 87 BPM | WEIGHT: 164.99 LBS | OXYGEN SATURATION: 98 %

## 2023-07-28 PROBLEM — U07.1 COVID-19 VIRUS INFECTION: Status: RESOLVED | Noted: 2022-12-14 | Resolved: 2023-07-28

## 2023-07-28 PROBLEM — Z78.9 NON-SMOKER: Status: ACTIVE | Noted: 2023-07-28

## 2023-07-28 PROBLEM — Z86.79 HISTORY OF HYPERTENSION: Status: RESOLVED | Noted: 2023-07-28 | Resolved: 2023-07-28

## 2023-07-28 PROBLEM — R26.89 BALANCE PROBLEM: Status: ACTIVE | Noted: 2023-03-02

## 2023-07-28 PROBLEM — Z78.9 SOCIAL ALCOHOL USE: Status: ACTIVE | Noted: 2023-07-28

## 2023-07-28 PROBLEM — Z82.49 FAMILY HISTORY OF HYPERTENSION: Status: ACTIVE | Noted: 2023-07-28

## 2023-07-28 PROBLEM — H61.23 BILATERAL IMPACTED CERUMEN: Status: RESOLVED | Noted: 2017-01-05 | Resolved: 2023-07-28

## 2023-07-28 PROBLEM — H90.3 BILATERAL SENSORINEURAL HEARING LOSS: Status: ACTIVE | Noted: 2017-01-05

## 2023-07-28 PROBLEM — Z86.69 HISTORY OF HYDROCEPHALUS: Status: RESOLVED | Noted: 2023-07-28 | Resolved: 2023-07-28

## 2023-07-28 PROCEDURE — 99024 POSTOP FOLLOW-UP VISIT: CPT

## 2023-07-28 NOTE — HISTORY OF PRESENT ILLNESS
[de-identified] : Mr. CHAHAL is a 72 year old man who was referred by Dr. Murray for hearing loss.\par He was accompanied by his wife, who contributed to history. \par \par He has worsened hearing since his last hearing test in 2016.\par No ear clogging, tinnitus, ear pain.\par No vertigo since 2016 but sometimes loses his balance.  New MRI showed increased hydrocephalus. \par OUSMANE scan was negative for Parkinson.\par \par \par

## 2023-07-28 NOTE — DATA REVIEWED
[de-identified] : \par AUDIOGRAM  (4/26/2023)\par RIGHT:  Hearing within normal limits 250- 1K Hz, sloping to severe SNHL\par LEFT:   Hearing within normal limits 250- 1K Hz, sloping to moderately severe SNHL.\par Asymmetry (20 dB) at 3K Hz, worse on left side\par Tympanograms  A  bilateral   \par Word recognition 90%  bilateral   \par \par AUDIOGRAM (10/13/16 -- after wax removal)\par Bilateral hearing normal through 1 k Hz, sloping to mild to moderately severe SNHL, with asymmetry (5-10 dB difference) at 3-6K Hz on left.\par Tympanograms A bilateral.\par Word recognition 100% bilateral.\par OAEs not detectable bilateral.

## 2023-07-28 NOTE — REVIEW OF SYSTEMS
[Patient Intake Form Reviewed] : Patient intake form was reviewed [Hearing Loss] : hearing loss [Vertigo] : vertigo [Nasal Congestion] : nasal congestion [Negative] : Heme/Lymph [As Noted in HPI] : as noted in HPI [Difficulty Walking] : difficulty walking [FreeTextEntry5] : high bp [de-identified] : abnormal gait

## 2023-07-28 NOTE — ASSESSMENT
[FreeTextEntry1] : Mr. Forde has bilateral SNHL mild to moderately severe or severe SNHL, slightly worse on left side.  Hearing is slightly worse compared to audiogram in 2016.  Word recognition is 90% bilateral.\par He has imbalance, not vertigo, and abnormal gait, likely related to hydrocephalus.\par \par He would benefit from amplification

## 2023-07-28 NOTE — PHYSICAL EXAM
[de-identified] : Right cerumen impaction removed with curette in order to see TM.  Left EAC clear [] : septum deviated to the left [Midline] : trachea located in midline position [Normal] : no rashes [de-identified] : Carotid pulses 2+ bilateral.

## 2023-08-04 ENCOUNTER — RX RENEWAL (OUTPATIENT)
Age: 73
End: 2023-08-04

## 2023-08-04 RX ORDER — AMLODIPINE BESYLATE 10 MG/1
10 TABLET ORAL
Qty: 90 | Refills: 3 | Status: ACTIVE | COMMUNITY
Start: 2021-12-15 | End: 1900-01-01

## 2023-08-10 ENCOUNTER — OUTPATIENT (OUTPATIENT)
Dept: OUTPATIENT SERVICES | Facility: HOSPITAL | Age: 73
LOS: 1 days | End: 2023-08-10
Payer: MEDICARE

## 2023-08-10 ENCOUNTER — APPOINTMENT (OUTPATIENT)
Dept: MRI IMAGING | Facility: HOSPITAL | Age: 73
End: 2023-08-10
Payer: MEDICARE

## 2023-08-10 DIAGNOSIS — Z98.890 OTHER SPECIFIED POSTPROCEDURAL STATES: Chronic | ICD-10-CM

## 2023-08-10 PROCEDURE — 72197 MRI PELVIS W/O & W/DYE: CPT | Mod: 26,MH

## 2023-08-10 PROCEDURE — 72197 MRI PELVIS W/O & W/DYE: CPT

## 2023-08-10 PROCEDURE — A9585: CPT

## 2023-08-14 ENCOUNTER — APPOINTMENT (OUTPATIENT)
Dept: UROLOGY | Facility: CLINIC | Age: 73
End: 2023-08-14
Payer: MEDICARE

## 2023-08-14 PROCEDURE — 99214 OFFICE O/P EST MOD 30 MIN: CPT

## 2023-08-14 NOTE — ASSESSMENT
[FreeTextEntry1] : 72 yo male with elevated PSA found to have a PIRADs 5 lesion in the left Tza.   I recommend that we proceed with transperineal fusion targeted prostate biopsy for further evaluation.  Risks of prostate biopsy were discussed including, but not limited to, bleeding, hematuria, infection, abscess, sepsis, rectal injury, urethral injury, or bladder injury, irritative or obstructive urinary symptoms, urinary retention, chronic pain, as well as the potential need for additional procedures.  The patient asked questions and they were answered.  Plan: 1. Schedule TP fusion prostate biopsy 2. PST  3. Medical clearance

## 2023-08-14 NOTE — DATA REVIEWED
[de-identified] : I have reviewed the most recent MRI which shows improved right hygroma with stable ventricular size\par \par \par ACC: [id number] EXAM: MR BRAIN ORDERED BY: SAVI GONZALES\par \par PROCEDURE DATE: 07/20/2023\par \par \par \par INTERPRETATION: INDICATIONS: Double vision with both eyes open, complaining of new onset of double vision for 3 days\par \par TECHNIQUE:\par Multiplanar multisequence imaging of the brain was obtained without intravenous contrast.\par \par COMPARISON: Brain MRI 3/13/2023, head CT 6/25/2023\par \par FINDINGS: There is artifact from the right scalp shunt reservoir which obscures evaluation particularly on the GRE and DWI sequences.\par INTRA-AXIAL: The diffusion-weighted images demonstrate no recent infarction. No parenchymal mass, focal mass effect, or midline shift is present. Prominent perivascular spaces within the caudate, lentiform nuclei and thalami. There is mild patchy periventricular subcortical white matter T2/FLAIR hyperintensities which are nonspecific but may represent the sequela of chronic small vessel ischemia.\par EXTRA-AXIAL: There is interval decrease size of the right parietal convexity subdural subacute to chronic subdural hematoma which measures up to 0.8 cm in transverse diameter previously 1.1 cm. The subdural hematoma demonstrates predominantly T2 hyperintense signal and T1 hypointense signal with linear areas of T1 hyperintensity and T2 hypointensity with susceptibility related signal loss. There is mild local mass effect without significant midline shift.. There is trace right frontal convexity subacute to chronic subdural hematoma which is similar to prior exam.\par VENTRICLES AND SULCI: Again post right frontal ventricular drainage catheter with the tip projecting in the left lateral ventricle. No significant change in ventricular caliber.\par VISUALIZED SINUSES: The visualized paranasal sinuses are clear.\par VISUALIZED MASTOIDS: Clear.\par VASCULAR FLOW VOIDS: Preserved.\par \par \par IMPRESSION:\par Since 6/25/2023, interval decrease right cerebral convexity subacute to chronic subdural hematoma. Status post right frontal approach ventricular shunt catheter with stable ventricular caliber. No acute infarction.\par \par --- End of Report ---

## 2023-08-14 NOTE — HISTORY OF PRESENT ILLNESS
[FreeTextEntry1] : 74 y/o male with PMHx of BPH, kidney stones, who presented as referral from [facility] for evaluation of NPH. \par \par Presented with symptoms of gait disturbance, described as "skating" on the floor x 6 months- 1 year. Denied cognitive deficits or urinary incontinence. 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 \par \par Has high volume LP with improvement in gait post procedure. \par \par 6/7/2023 s/p VPS (Certas @ 7)\par Post op hospital stay was c/b urinary retention (s/p straight cath, symptoms improved with flomax), he was discharged to home.\par \par 6/21/23 post op visit. \par He had recurrent urinary retention with fever and went to ED where he was found to have UTI (+ E.Coli, currently tx abx), s/p dill cath (managed by urology, Dr. Garcia).\par He reports his gait has been slightly worsening since he dx UTI but denies new/worsening focal neuro deficits. Currently taking Tylenol for UTI/fever. \par Plan was made to repeat CTH asap given worsening symptom and return in one week.\par \par 6/26/23 pt returned with CTH which showed no acute hemorrhage, unrelated small hygroma. Shunt dialed from @7 to @8. Plan made for him to obtain MRI for further soft tissue evaluation/ ventricle size after shunt adjustment and RTC after. Also sent for optho for double vision issues. \par \par \par TODAY pt returns for follow- up and MRI review which was done 7/20/23. 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 \par

## 2023-08-14 NOTE — HISTORY OF PRESENT ILLNESS
[FreeTextEntry1] : 3/14/23: 73 yo male referred for elevated PSA and LUTS. He has a hx of fluctuating PSA and per the patient is s/p TRUS biopsy several years ago which was negative. He may have also had another biopsy at some point after that which was also negative, He has never had a prostate MRI due to claustrophobia. His most recent PSA was from 3/2/23 and was 6.44.   He also complains of frequency and urgency. He is on tamsulosin which helps, but he is mainly bothered by urgency and frequency. He denies dysuria or hematuria.    Of note, he has a hx of stones and is s/p PCNL several years ago with Dr. Huffman.   PVR = 0 cc (LUTS) IPSS = 12; QoL = 3  ************* 6/12/23: Patient returns after shunt placement on 6/14/23. Post-op he was straight cathed x2. He went home and was urinating very small amounts over the weekend. Today it has improved slightly.   PVR: >612 ml (LUTS)  ************ 6/16/23: Patient returns for TOV after going into retention following  shunt placement.   TOV: -120 cc sterile saline instilled in bladder - Catheter remove din its entirety - Patient voided after 2 hours, PVR = 95 cc  *************  6/26/23: Patient returns for TOV. He went into retention on 6/17/23 and had catheter emergently placed in ED.   - 240 cc sterile water instilled in bladder - Catheter remove din its entirety - Patient initially voided 100 cc, he voided an additional 4 times over the course of 4 hours.  Final PVR was 86 cc.  *************** 7/14/23: Patient returns for follow up.  He has been voding well.  He completed his course of abx and feels well and denies any LUTS.  He will schedule his prostate MRI in the next few weeks.   PVR = 15 cc (LUTS)  *************** 8/14/23: Patient returns to review MR prostate.  MRI shows a 75 cc prostate (PSAD = 0.1) with a Left base to mid TZa 1.6 cm PIRADs 5 lesion.  He is voiding well and without complaint.

## 2023-08-14 NOTE — PHYSICAL EXAM
[Person] : oriented to person [Place] : oriented to place [Time] : oriented to time [Cranial Nerves Optic (II)] : visual acuity intact bilaterally,  pupils equal round and reactive to light [Cranial Nerves Trigeminal (V)] : facial sensation intact symmetrically [Cranial Nerves Facial (VII)] : face symmetrical [Cranial Nerves Vestibulocochlear (VIII)] : hearing was intact bilaterally [Cranial Nerves Glossopharyngeal (IX)] : tongue and palate midline [Cranial Nerves Accessory (XI - Cranial And Spinal)] : head turning and shoulder shrug symmetric [Cranial Nerves Hypoglossal (XII)] : there was no tongue deviation with protrusion [Motor Tone] : muscle tone was normal in all four extremities [Motor Strength] : muscle strength was normal in all four extremities [Sensation Tactile Decrease] : light touch was intact [Abnormal Walk] : normal gait [Balance] : balance was intact [Sclera] : the sclera and conjunctiva were normal [PERRL With Normal Accommodation] : pupils were equal in size, round, reactive to light, with normal accommodation [Full Visual Field] : full visual field [FreeTextEntry5] : right sixth nerve palsy [FreeTextEntry1] : right sixth nerve palsy

## 2023-08-14 NOTE — ASSESSMENT
[FreeTextEntry1] : \par \par Patient and patient's family verbalize agreement and understanding with plan of care.\par \par I, Dr. Wasserman, personally performed the evaluation and management (E/M) services for this established patient who presents today with (a) new problem(s)/exacerbation of (an) existing condition(s). That E/M includes conducting the examination, assessing all new/exacerbated conditions, and establishing a new plan of care. Today, my ACP, June Angel, was here to observe my evaluation and management services for this new problem/exacerbated condition to be followed going forward.\par \par PLAN:\par \par -continue optho follow up\par -continue PT twice weekly\par -CERTAS confirmed at 7.5\par -follow up in 6 months

## 2023-08-14 NOTE — PHYSICAL EXAM
[General Appearance - Well Developed] : well developed [Normal Appearance] : normal appearance [Abdomen Soft] : soft [Skin Color & Pigmentation] : normal skin color and pigmentation [Heart Rate And Rhythm] : Heart rate and rhythm were normal [] : no respiratory distress [Oriented To Time, Place, And Person] : oriented to person, place, and time [Normal Station and Gait] : the gait and station were normal for the patient's age

## 2023-08-15 LAB
ANION GAP SERPL CALC-SCNC: 12 MMOL/L
APTT BLD: 32.2 SEC
BUN SERPL-MCNC: 20 MG/DL
CALCIUM SERPL-MCNC: 9.8 MG/DL
CHLORIDE SERPL-SCNC: 102 MMOL/L
CO2 SERPL-SCNC: 26 MMOL/L
CREAT SERPL-MCNC: 0.85 MG/DL
EGFR: 91 ML/MIN/1.73M2
GLUCOSE SERPL-MCNC: 160 MG/DL
INR PPP: 0.96 RATIO
POTASSIUM SERPL-SCNC: 4.5 MMOL/L
PT BLD: 10.9 SEC
SODIUM SERPL-SCNC: 140 MMOL/L

## 2023-08-16 LAB — BACTERIA UR CULT: NORMAL

## 2023-08-31 ENCOUNTER — APPOINTMENT (OUTPATIENT)
Dept: INTERNAL MEDICINE | Facility: CLINIC | Age: 73
End: 2023-08-31
Payer: MEDICARE

## 2023-08-31 VITALS
HEART RATE: 90 BPM | HEIGHT: 69 IN | TEMPERATURE: 98.1 F | OXYGEN SATURATION: 98 % | BODY MASS INDEX: 25.18 KG/M2 | DIASTOLIC BLOOD PRESSURE: 83 MMHG | WEIGHT: 170 LBS | RESPIRATION RATE: 18 BRPM | SYSTOLIC BLOOD PRESSURE: 145 MMHG

## 2023-08-31 VITALS — SYSTOLIC BLOOD PRESSURE: 130 MMHG | DIASTOLIC BLOOD PRESSURE: 70 MMHG

## 2023-08-31 DIAGNOSIS — Z01.818 ENCOUNTER FOR OTHER PREPROCEDURAL EXAMINATION: ICD-10-CM

## 2023-08-31 PROCEDURE — 99213 OFFICE O/P EST LOW 20 MIN: CPT

## 2023-08-31 NOTE — ASU PATIENT PROFILE, ADULT - NS PREOP UNDERSTANDS INFO
No solid food/dairy/candy/gum after 10:30pm Monday, water is allowed before 05:30am Tuesday; patient reminded to come with photo ID/insurance card; dress in comfortable clothes; no jewelries/contact lens/valuables; no smoking/alcohol drinking/recreational drug use Monday; escort must have a photo ID; address and callback number was given./yes

## 2023-08-31 NOTE — ASU PATIENT PROFILE, ADULT - FALL HARM RISK - HARM RISK INTERVENTIONS
Assistance with ambulation/Assistance OOB with selected safe patient handling equipment/Communicate Risk of Fall with Harm to all staff/Discuss with provider need for PT consult/Monitor gait and stability/Reinforce activity limits and safety measures with patient and family/Tailored Fall Risk Interventions/Visual Cue: Yellow wristband and red socks/Bed in lowest position, wheels locked, appropriate side rails in place/Call bell, personal items and telephone in reach/Instruct patient to call for assistance before getting out of bed or chair/Non-slip footwear when patient is out of bed/[place] to call system/Physically safe environment - no spills, clutter or unnecessary equipment/Purposeful Proactive Rounding/Room/bathroom lighting operational, light cord in reach Assistance with ambulation/Assistance OOB with selected safe patient handling equipment/Communicate Risk of Fall with Harm to all staff/Discuss with provider need for PT consult/Monitor gait and stability/Provide patient with walking aids - walker, cane, crutches/Reinforce activity limits and safety measures with patient and family/Tailored Fall Risk Interventions/Visual Cue: Yellow wristband and red socks/Bed in lowest position, wheels locked, appropriate side rails in place/Call bell, personal items and telephone in reach/Instruct patient to call for assistance before getting out of bed or chair/Non-slip footwear when patient is out of bed/[place] to call system/Physically safe environment - no spills, clutter or unnecessary equipment/Purposeful Proactive Rounding/Room/bathroom lighting operational, light cord in reach

## 2023-08-31 NOTE — ASU PATIENT PROFILE, ADULT - NSICDXPASTMEDICALHX_GEN_ALL_CORE_FT
Patient will have AVF surgery somewhere else.  IR service was contacted for tunneled catheter placement. PAST MEDICAL HISTORY:  2019 novel coronavirus disease (COVID-19)     Ankle swelling     Balance problem     GERD (gastroesophageal reflux disease) resolved    H/O hydrocephalus     History of BPH     HTN (hypertension)     Kidney stone SURGERY DONE    MP (myelopathy)      PAST MEDICAL HISTORY:  2019 novel coronavirus disease (COVID-19)     Ankle swelling     Balance problem     GERD (gastroesophageal reflux disease) resolved    H/O hydrocephalus     H/O sinusitis     History of BPH     HTN (hypertension)     Kidney stone SURGERY DONE    MP (myelopathy)

## 2023-08-31 NOTE — HISTORY OF PRESENT ILLNESS
[No Pertinent Cardiac History] : no history of aortic stenosis, atrial fibrillation, coronary artery disease, recent myocardial infarction, or implantable device/pacemaker [No Pertinent Pulmonary History] : no history of asthma, COPD, sleep apnea, or smoking [Family Member] : no family member with adverse anesthesia reaction/sudden death [Self] : no previous adverse anesthesia reaction [FreeTextEntry1] : Prostate biopsy  [FreeTextEntry2] : 09/06/2023 [FreeTextEntry3] : Dr Garcia  [FreeTextEntry4] : Walking improved.  Patient with Prostate Cancer and will get Prostate biopsy.

## 2023-08-31 NOTE — ASU PATIENT PROFILE, ADULT - NSICDXPASTSURGICALHX_GEN_ALL_CORE_FT
PAST SURGICAL HISTORY:  H/O lithotripsy     History of surgery RIGHT TESTICLE CYST REMOVED    S/P  shunt      PAST SURGICAL HISTORY:  H/O lithotripsy     H/O prostate biopsy     History of surgery RIGHT TESTICLE CYST REMOVED    S/P  shunt

## 2023-09-01 ENCOUNTER — OUTPATIENT (OUTPATIENT)
Dept: OUTPATIENT SERVICES | Facility: HOSPITAL | Age: 73
LOS: 1 days | End: 2023-09-01
Payer: MEDICARE

## 2023-09-01 DIAGNOSIS — Z98.890 OTHER SPECIFIED POSTPROCEDURAL STATES: Chronic | ICD-10-CM

## 2023-09-01 DIAGNOSIS — Z98.2 PRESENCE OF CEREBROSPINAL FLUID DRAINAGE DEVICE: Chronic | ICD-10-CM

## 2023-09-01 DIAGNOSIS — G91.2 (IDIOPATHIC) NORMAL PRESSURE HYDROCEPHALUS: ICD-10-CM

## 2023-09-01 PROCEDURE — 70450 CT HEAD/BRAIN W/O DYE: CPT | Mod: 26,MH

## 2023-09-01 PROCEDURE — 70450 CT HEAD/BRAIN W/O DYE: CPT

## 2023-09-02 DIAGNOSIS — G91.2 (IDIOPATHIC) NORMAL PRESSURE HYDROCEPHALUS: ICD-10-CM

## 2023-09-04 ENCOUNTER — TRANSCRIPTION ENCOUNTER (OUTPATIENT)
Age: 73
End: 2023-09-04

## 2023-09-05 ENCOUNTER — TRANSCRIPTION ENCOUNTER (OUTPATIENT)
Age: 73
End: 2023-09-05

## 2023-09-05 ENCOUNTER — OUTPATIENT (OUTPATIENT)
Dept: OUTPATIENT SERVICES | Facility: HOSPITAL | Age: 73
LOS: 1 days | Discharge: HOME | End: 2023-09-05
Payer: MEDICARE

## 2023-09-05 ENCOUNTER — APPOINTMENT (OUTPATIENT)
Dept: UROLOGY | Facility: AMBULATORY SURGERY CENTER | Age: 73
End: 2023-09-05

## 2023-09-05 ENCOUNTER — NON-APPOINTMENT (OUTPATIENT)
Age: 73
End: 2023-09-05

## 2023-09-05 ENCOUNTER — RESULT REVIEW (OUTPATIENT)
Age: 73
End: 2023-09-05

## 2023-09-05 VITALS
SYSTOLIC BLOOD PRESSURE: 136 MMHG | RESPIRATION RATE: 14 BRPM | OXYGEN SATURATION: 98 % | DIASTOLIC BLOOD PRESSURE: 78 MMHG | HEART RATE: 92 BPM

## 2023-09-05 VITALS
TEMPERATURE: 97 F | HEART RATE: 86 BPM | SYSTOLIC BLOOD PRESSURE: 137 MMHG | DIASTOLIC BLOOD PRESSURE: 77 MMHG | RESPIRATION RATE: 16 BRPM | OXYGEN SATURATION: 98 % | WEIGHT: 156.53 LBS | HEIGHT: 69 IN

## 2023-09-05 DIAGNOSIS — Z98.890 OTHER SPECIFIED POSTPROCEDURAL STATES: Chronic | ICD-10-CM

## 2023-09-05 DIAGNOSIS — Z98.2 PRESENCE OF CEREBROSPINAL FLUID DRAINAGE DEVICE: Chronic | ICD-10-CM

## 2023-09-05 PROCEDURE — 88344 IMHCHEM/IMCYTCHM EA MLT ANTB: CPT | Mod: 26

## 2023-09-05 PROCEDURE — G0416: CPT | Mod: 26

## 2023-09-05 PROCEDURE — 76377 3D RENDER W/INTRP POSTPROCES: CPT | Mod: 26

## 2023-09-05 PROCEDURE — 55706 BX PRST8 NDL SAT SAMPLING: CPT | Mod: 22

## 2023-09-05 PROCEDURE — 76872 US TRANSRECTAL: CPT | Mod: 26

## 2023-09-05 RX ORDER — AMLODIPINE BESYLATE 2.5 MG/1
1 TABLET ORAL
Refills: 0 | DISCHARGE

## 2023-09-05 RX ORDER — POTASSIUM CITRATE MONOHYDRATE 100 %
2 POWDER (GRAM) MISCELLANEOUS
Refills: 0 | DISCHARGE

## 2023-09-05 RX ORDER — FENTANYL CITRATE 50 UG/ML
25 INJECTION INTRAVENOUS
Refills: 0 | Status: DISCONTINUED | OUTPATIENT
Start: 2023-09-05 | End: 2023-09-05

## 2023-09-05 RX ORDER — SODIUM CHLORIDE 9 MG/ML
1000 INJECTION, SOLUTION INTRAVENOUS
Refills: 0 | Status: DISCONTINUED | OUTPATIENT
Start: 2023-09-05 | End: 2023-09-05

## 2023-09-05 RX ORDER — SODIUM CHLORIDE 0.65 %
2 AEROSOL, SPRAY (ML) NASAL
Refills: 0 | DISCHARGE

## 2023-09-05 RX ADMIN — SODIUM CHLORIDE 100 MILLILITER(S): 9 INJECTION, SOLUTION INTRAVENOUS at 09:46

## 2023-09-05 NOTE — ASU DISCHARGE PLAN (ADULT/PEDIATRIC) - NS MD DC FALL RISK RISK
For information on Fall & Injury Prevention, visit: [email or web address] OR  [email or web address] OR  https://www.cdc.gov/steadi/patient.html

## 2023-09-05 NOTE — ASU DISCHARGE PLAN (ADULT/PEDIATRIC) - MODE OF TRANSPORTATION
HCGQUANT     ABGs: No results found for: PHART, PO2ART, AQJ8EIK, GHQ2UDC, BEART, F0YIUTRM     Type & Screen (If Applicable):  Lab Results   Component Value Date    LABRH NEG 07/11/2018       Anesthesia Evaluation    Airway: Mallampati: II       Mouth opening: > = 3 FB Dental:          Pulmonary:                              Cardiovascular:    (+) CAD:,       ECG reviewed  Rhythm: regular                      Neuro/Psych:   (+) psychiatric history:            GI/Hepatic/Renal:   (+) GERD:,           Endo/Other:    (+) Diabetes, . Abdominal:   (+) obese,         Vascular:                                        Anesthesia Plan      general     ASA 4             Anesthetic plan and risks discussed with patient. Plan discussed with CRNA.                   Gudelia Su MD   12/7/2018 Ambulatory

## 2023-09-05 NOTE — ASU DISCHARGE PLAN (ADULT/PEDIATRIC) - CARE PROVIDER_API CALL
Sirdhar Garcia  Urology  [address] [place] [id number]  Phone: [phone]  Fax: [phone]  Follow Up Time: 2 weeks

## 2023-09-05 NOTE — BRIEF OPERATIVE NOTE - NSICDXBRIEFPROCEDURE_GEN_ALL_CORE_FT
PROCEDURES:  Biopsy, prostate, with integrated MRI-transrectal US guidance 05-Sep-2023 09:21:27  Susy Jhaveri

## 2023-09-05 NOTE — ASU DISCHARGE PLAN (ADULT/PEDIATRIC) - ASU DC SPECIAL INSTRUCTIONSFT
PROSTATE BIOPSY    GENERAL: Expect blood in the urine, stool, and ejaculate for up to two (2) months postoperatively. This is normal and to be expected after this procedure. Increase hydration to keep the urine as clear as possible.    SURGICAL WOUND: There are often lumps and bumps that can be felt in the perineum (skin between scrotum and anus) for up to two (2) months or more post operatively. These are of no concern and with time they will soften and disappear.  Any “black and blue” bruising areas will also resolve.  You may apply an ice-pack for 15 minutes out of every hour for the first 24 -36 hours to minimize pain and swelling. You may apply over the counter Bacitracin to the wound several times a day, and keep covered with over the counter gauze as necessary.    CATHETER: Some patients are sent home with a Mustafa catheter, while others go home urinating on their own. A Mustafa catheter continuously drains the urine from the bladder. If you still have a catheter, the nurses will review instructions and care before you go home.     PAIN: You may have some intermittent pain for up to six (6) weeks post operatively. Pain does not signify any problem unless associated with fever, chills, or inability to void.  If you experience any fevers or chills please call immediately as this may be signs of an infection. You may take Tylenol (acetaminophen) 650-975mg and/or Motrin (ibuprofen) 400-600mg, both available over the counter, for pain every 6 hours as needed. Do not exceed 4000mg of Tylenol (acetaminophen) daily. You may alternate these medications such that you take one or the other every 3 hours for around the clock pain coverage.    ANTICOAGULATION: If you are taking any blood thinning medications, please discuss with your urologist prior to restarting these medications unless otherwise specified.    BATHING: You may shower with soap and water, and pat dry the needle insertion sites in the perineum. Avoid sitting in water for prolonged periods of time for the next few days.    DIET: You may resume your regular diet and regular medication regimen.    ACTIVITY: No heavy lifting or strenuous exercise until you are evaluated at your post-operative appointment. Otherwise, you may return to your usual level of physical activity.    FOLLOW-UP: If you did not already schedule your post-operative appointment, please call your urologist to schedule and follow-up appointment.    CALL YOUR UROLOGIST IF: You have any bleeding that does not stop, inability to void >8 hours, fever over 100.4 F, chills, persistent nausea/vomiting, changes in your incision concerning for infection, or if your pain is not controlled on your discharge pain medications.

## 2023-09-06 ENCOUNTER — OUTPATIENT (OUTPATIENT)
Dept: OUTPATIENT SERVICES | Facility: HOSPITAL | Age: 73
LOS: 1 days | End: 2023-09-06

## 2023-09-06 DIAGNOSIS — Z98.890 OTHER SPECIFIED POSTPROCEDURAL STATES: Chronic | ICD-10-CM

## 2023-09-06 DIAGNOSIS — Z98.2 PRESENCE OF CEREBROSPINAL FLUID DRAINAGE DEVICE: Chronic | ICD-10-CM

## 2023-09-06 PROBLEM — K21.9 GASTRO-ESOPHAGEAL REFLUX DISEASE WITHOUT ESOPHAGITIS: Chronic | Status: ACTIVE | Noted: 2023-06-06

## 2023-09-06 PROBLEM — Z87.09 PERSONAL HISTORY OF OTHER DISEASES OF THE RESPIRATORY SYSTEM: Chronic | Status: ACTIVE | Noted: 2023-09-05

## 2023-09-08 ENCOUNTER — NON-APPOINTMENT (OUTPATIENT)
Age: 73
End: 2023-09-08

## 2023-09-08 LAB — SURGICAL PATHOLOGY STUDY: SIGNIFICANT CHANGE UP

## 2023-09-11 ENCOUNTER — NON-APPOINTMENT (OUTPATIENT)
Age: 73
End: 2023-09-11

## 2023-09-29 NOTE — DISCHARGE NOTE NURSING/CASE MANAGEMENT/SOCIAL WORK - NSTOBACCONEVERSMOKERY/N_GEN_A
Eri Estrada Patient Age: 55 year old  MESSAGE: Interpreting service used: No    Insurance on file confirmed with caller: Yes    IM/FP- Symptom-  Adult-  Yellow Symptom-     BUMPS INSIDE NOSE           Appointment: Caller declined making an appointment before speaking with the nurse.     Caller connected to triage- Yes- [place]- Connect call to Triage queue- Route message to provider's clinical support pool    Message read back to caller for accuracy: Yes       ALLERGIES:  Gluten meal   (food or med), Soybean allergy   (food or med), Flax seed   (food or med), Naproxen, Sunflower oil   (food or med), and Onion   (food or med)  Current Outpatient Medications   Medication Sig Dispense Refill   • traMADol (ULTRAM) 50 MG tablet Take 1 tablet by mouth every 8 hours as needed for Pain. 30 tablet 0   • ECHINACEA COMPLEX PO      • BLACK WALNUT POLLEN SC      • Probiotic Product (ACIDOPHILUS/GOAT MILK PO)      • Acetaminophen (TYLENOL PO)        No current facility-administered medications for this visit.     PHARMACY to use: [facility]          Pharmacy preference(s) on file:   [facility] [address] [place] [address] - [address]  [address]  [facility] [address]  Phone: [phone] Fax: [phone]      CALL BACK INFO: Ok to leave response (including medical information) on answering machine      PCP: Tarun Wright MD         INS: Payor: ZENAIDA/MICHELLE / Plan: [id number] / Product Type: PPO MISC   PATIENT ADDRESS:  [address] [place] [address]   [facility] [id number]    
complains of 3 white pustules in nose    ? If this infected    1 was painful and popped with q tip  Noted having 2 more in nose    Advised could go to urgent care    Offered appt Monday    Patient stated she would call back      
No

## 2023-10-17 ENCOUNTER — NON-APPOINTMENT (OUTPATIENT)
Age: 73
End: 2023-10-17

## 2023-11-06 ENCOUNTER — APPOINTMENT (OUTPATIENT)
Dept: NEUROLOGY | Facility: CLINIC | Age: 73
End: 2023-11-06
Payer: MEDICARE

## 2023-11-06 VITALS
TEMPERATURE: 98.1 F | BODY MASS INDEX: 24.29 KG/M2 | OXYGEN SATURATION: 97 % | WEIGHT: 164 LBS | HEIGHT: 69 IN | HEART RATE: 87 BPM | SYSTOLIC BLOOD PRESSURE: 170 MMHG | DIASTOLIC BLOOD PRESSURE: 82 MMHG

## 2023-11-06 DIAGNOSIS — M54.50 LOW BACK PAIN, UNSPECIFIED: ICD-10-CM

## 2023-11-06 DIAGNOSIS — G89.29 LOW BACK PAIN, UNSPECIFIED: ICD-10-CM

## 2023-11-06 PROCEDURE — 99214 OFFICE O/P EST MOD 30 MIN: CPT

## 2023-11-20 ENCOUNTER — APPOINTMENT (OUTPATIENT)
Dept: NEUROSURGERY | Facility: CLINIC | Age: 73
End: 2023-11-20
Payer: MEDICARE

## 2023-11-20 ENCOUNTER — RESULT REVIEW (OUTPATIENT)
Age: 73
End: 2023-11-20

## 2023-11-20 ENCOUNTER — OUTPATIENT (OUTPATIENT)
Dept: OUTPATIENT SERVICES | Facility: HOSPITAL | Age: 73
LOS: 1 days | End: 2023-11-20
Payer: MEDICARE

## 2023-11-20 ENCOUNTER — APPOINTMENT (OUTPATIENT)
Dept: MRI IMAGING | Facility: HOSPITAL | Age: 73
End: 2023-11-20
Payer: MEDICARE

## 2023-11-20 DIAGNOSIS — Z98.890 OTHER SPECIFIED POSTPROCEDURAL STATES: Chronic | ICD-10-CM

## 2023-11-20 DIAGNOSIS — Z98.2 PRESENCE OF CEREBROSPINAL FLUID DRAINAGE DEVICE: Chronic | ICD-10-CM

## 2023-11-20 DIAGNOSIS — M54.9 DORSALGIA, UNSPECIFIED: ICD-10-CM

## 2023-11-20 PROCEDURE — 99214 OFFICE O/P EST MOD 30 MIN: CPT

## 2023-11-20 PROCEDURE — 72148 MRI LUMBAR SPINE W/O DYE: CPT | Mod: MH

## 2023-11-20 PROCEDURE — 72148 MRI LUMBAR SPINE W/O DYE: CPT | Mod: 26,MH

## 2023-11-22 ENCOUNTER — NON-APPOINTMENT (OUTPATIENT)
Age: 73
End: 2023-11-22

## 2024-01-19 ENCOUNTER — APPOINTMENT (OUTPATIENT)
Dept: NEUROLOGY | Facility: CLINIC | Age: 74
End: 2024-01-19

## 2024-02-28 DIAGNOSIS — J10.1 INFLUENZA DUE TO OTHER IDENTIFIED INFLUENZA VIRUS WITH OTHER RESPIRATORY MANIFESTATIONS: ICD-10-CM

## 2024-02-28 RX ORDER — OSELTAMIVIR PHOSPHATE 75 MG/1
75 CAPSULE ORAL TWICE DAILY
Qty: 10 | Refills: 0 | Status: ACTIVE | COMMUNITY
Start: 2024-02-28 | End: 1900-01-01

## 2024-03-04 ENCOUNTER — RESULT REVIEW (OUTPATIENT)
Age: 74
End: 2024-03-04

## 2024-03-04 ENCOUNTER — OUTPATIENT (OUTPATIENT)
Dept: OUTPATIENT SERVICES | Facility: HOSPITAL | Age: 74
LOS: 1 days | End: 2024-03-04
Payer: MEDICARE

## 2024-03-04 DIAGNOSIS — Z98.890 OTHER SPECIFIED POSTPROCEDURAL STATES: Chronic | ICD-10-CM

## 2024-03-04 DIAGNOSIS — Z00.8 ENCOUNTER FOR OTHER GENERAL EXAMINATION: ICD-10-CM

## 2024-03-04 DIAGNOSIS — Z98.2 PRESENCE OF CEREBROSPINAL FLUID DRAINAGE DEVICE: Chronic | ICD-10-CM

## 2024-03-04 DIAGNOSIS — G91.2 (IDIOPATHIC) NORMAL PRESSURE HYDROCEPHALUS: ICD-10-CM

## 2024-03-04 PROCEDURE — 70450 CT HEAD/BRAIN W/O DYE: CPT

## 2024-03-04 PROCEDURE — 70450 CT HEAD/BRAIN W/O DYE: CPT | Mod: 26,MH

## 2024-03-05 DIAGNOSIS — G91.2 (IDIOPATHIC) NORMAL PRESSURE HYDROCEPHALUS: ICD-10-CM

## 2024-03-07 ENCOUNTER — RX RENEWAL (OUTPATIENT)
Age: 74
End: 2024-03-07

## 2024-03-07 DIAGNOSIS — R11.0 NAUSEA: ICD-10-CM

## 2024-03-07 RX ORDER — ONDANSETRON 4 MG/1
4 TABLET ORAL EVERY 8 HOURS
Qty: 20 | Refills: 1 | Status: ACTIVE | COMMUNITY
Start: 2024-03-07 | End: 1900-01-01

## 2024-03-15 ENCOUNTER — APPOINTMENT (OUTPATIENT)
Dept: NEUROSURGERY | Facility: CLINIC | Age: 74
End: 2024-03-15
Payer: MEDICARE

## 2024-03-15 ENCOUNTER — APPOINTMENT (OUTPATIENT)
Dept: NEUROLOGY | Facility: CLINIC | Age: 74
End: 2024-03-15
Payer: MEDICARE

## 2024-03-15 ENCOUNTER — APPOINTMENT (OUTPATIENT)
Dept: NEUROSURGERY | Facility: CLINIC | Age: 74
End: 2024-03-15

## 2024-03-15 VITALS
HEART RATE: 94 BPM | WEIGHT: 162 LBS | HEIGHT: 69 IN | DIASTOLIC BLOOD PRESSURE: 77 MMHG | SYSTOLIC BLOOD PRESSURE: 149 MMHG | TEMPERATURE: 97.5 F | BODY MASS INDEX: 23.99 KG/M2 | OXYGEN SATURATION: 96 %

## 2024-03-15 VITALS
DIASTOLIC BLOOD PRESSURE: 69 MMHG | WEIGHT: 162 LBS | SYSTOLIC BLOOD PRESSURE: 130 MMHG | RESPIRATION RATE: 18 BRPM | BODY MASS INDEX: 23.99 KG/M2 | HEART RATE: 78 BPM | OXYGEN SATURATION: 96 % | HEIGHT: 69 IN

## 2024-03-15 DIAGNOSIS — M48.061 SPINAL STENOSIS, LUMBAR REGION WITHOUT NEUROGENIC CLAUDICATION: ICD-10-CM

## 2024-03-15 PROCEDURE — 99214 OFFICE O/P EST MOD 30 MIN: CPT

## 2024-03-15 NOTE — PHYSICAL EXAM
[FreeTextEntry1] : Gen: appears well, well-nourished, no acute distress MS: awake, alert, oriented, speech fluent, comprehension intact, good fund of knowledge, recent and remote memory intact, attention intact CN: PERRL, EOMI, visual fields full, facial strength and sensation intact and symmetric, palate elevation symmetric, tongue midline, no tongue atrophy or fasciculations Motor: mild cogwheeling b/l R>L; 5/5 strength throughout, Intention tremor but no resting or postural tremor Sensory: vibration - absent at toes, reduced R>L at the ankles; JPS intact at toes b/l. Temperature intact b/l. Reflexes: slightly brisk 3+ symmetric throughout, no Gomez's sign, plantar responses flexor bilaterally Coordination: no dysmetria on finger to nose, Romberg negative Gait: slightly wide based

## 2024-03-15 NOTE — PHYSICAL EXAM
[General Appearance - Alert] : alert [General Appearance - In No Acute Distress] : in no acute distress [Person] : oriented to person [Place] : oriented to place [Time] : oriented to time [Motor Tone] : muscle tone was normal in all four extremities [Motor Strength] : muscle strength was normal in all four extremities [Abnormal Walk] : normal gait [Balance] : balance was intact

## 2024-03-15 NOTE — ASSESSMENT
[FreeTextEntry1] : 73-year-old male here for follow up for NPH s/p  shunt with improvement, and lumbar back pain, now resolved. MRI L spine w/ mild degenerative changes, CTH on 3/4 was stable.  PLAN: - Continue follow-up with neurosurgery - Continue PT - RTC in 3-4 months

## 2024-03-15 NOTE — HISTORY OF PRESENT ILLNESS
[FreeTextEntry1] : This is a 73-year-old male who presents for follow up for gait dysfunction, s/p  shunt for NPH.  He notes improvement in his gait since last visit. Balance has been stable, had flu several weeks ago and had one instance with falling backwards but was caught by wife. Not happened since then. No pulling sensation in one direction or the other. If he stands too long his balance is still problematic, but still significantly improved after shunt placement. Ambulates independently, has a walker but does not need it. Follows with neurosurgery for NPH s/p  shunt, CT head on 3/4/23 was stable. Having cough since the flu and facial pain, improved after sinus medication.  Has been off PT twice a week, stopped after flu for 3 weeks, started again this week.  Having some urinary frequency at night, follows with urology Dr. Garcia for BPH No changes in cognition.  Last visit recommended MRI L-spine, Multilevel degenerative disc disease with mild central spinal canal stenosis L2-L3 as detailed above. Back pain has resolved, getting TENS at PT for the past 2 months which has helped significantly.   Personally reviewed and interpreted: CT head - s/p right frontal  shunt, in place, ventriculomegaly unchanged

## 2024-03-20 NOTE — DATA REVIEWED
[de-identified] : Noncontrast head CT 3/4/24 at [facility] demonstrates stable venticular size and catheter position

## 2024-03-20 NOTE — HISTORY OF PRESENT ILLNESS
[FreeTextEntry1] : 74 y/o male with PMHx of BPH, kidney stones, who presented as referral from [facility] for evaluation of NPH.   Presented with symptoms of gait disturbance, described as "skating" on the floor x 6 months- 1 year. Denied cognitive deficits or urinary incontinence.   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   Has high volume LP with improvement in gait post procedure.   6/7/2023 s/p VPS (Certas @ 7) Post op hospital stay was c/b urinary retention (s/p straight cath, symptoms improved with flomax), he was discharged to home.  6/21/23 post op visit.  He had recurrent urinary retention with fever and went to ED where he was found to have UTI (+ E.Coli, currently tx abx), s/p dill cath (managed by urology, Dr. Garcia). He reports his gait has been slightly worsening since he dx UTI but denies new/worsening focal neuro deficits. Currently taking Tylenol for UTI/fever.  Plan was made to repeat CTH asap given worsening symptom and return in one week.  6/26/23 pt returned with CTH which showed no acute hemorrhage, unrelated small hygroma. Shunt dialed from @7 to @8. Plan made for him to obtain MRI for further soft tissue evaluation/ ventricle size after shunt adjustment and RTC after. Also sent for optho for double vision issues.    7/28/23: pt returns for follow- up and MRI review which was done 7/20/23. 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   11/20/23: Returns today. Has been doing well. Diplopia resolved. Had MRI lumbar spine done earlier today and returns today to check  shunt setting.   shunt at 7.5. Reports nonradiating low back pain. States pain is worse with prolonged standing/sitting. He is currently in physical therapy.  3/15/24 He comes in today for routine follow up with a new head CT for review. He had the flu 3 weeks ago and reports a significant headache with coughing. Denies headaches prior to the flu. Reports his PT tells him he drags his feet occasionally. Denies any recent falls. Denies any double vision at this time.

## 2024-03-20 NOTE — ASSESSMENT
[FreeTextEntry1] : Noncontrast head CT reviewed, stable.   shunt checked = 7 Will maintain  shunt at 7 Recommend CT head without contrast in 1 year. After CT head complete RTO to review imaging with Dr. Wasserman   Patient and patient's family verbalize agreement and understanding with plan of care.  I, Dr. Wasserman, personally performed the evaluation and management (E/M) services for this established patient who presents today with (a) new problem(s)/exacerbation of (an) existing condition(s).  That E/M includes conducting the examination, assessing all new/exacerbated conditions, and establishing a new plan of care.  Today, my ACP, Ginette Hunt, was here to observe my evaluation and management services for this new problem/exacerbated condition to be followed going forward.

## 2024-04-08 ENCOUNTER — APPOINTMENT (OUTPATIENT)
Dept: UROLOGY | Facility: CLINIC | Age: 74
End: 2024-04-08
Payer: MEDICARE

## 2024-04-08 ENCOUNTER — APPOINTMENT (OUTPATIENT)
Dept: INTERNAL MEDICINE | Facility: CLINIC | Age: 74
End: 2024-04-08
Payer: MEDICARE

## 2024-04-08 VITALS
HEART RATE: 79 BPM | DIASTOLIC BLOOD PRESSURE: 76 MMHG | WEIGHT: 162 LBS | OXYGEN SATURATION: 97 % | SYSTOLIC BLOOD PRESSURE: 143 MMHG | HEIGHT: 69 IN | TEMPERATURE: 98.3 F | BODY MASS INDEX: 23.99 KG/M2

## 2024-04-08 VITALS
DIASTOLIC BLOOD PRESSURE: 73 MMHG | TEMPERATURE: 98 F | HEIGHT: 69 IN | HEART RATE: 112 BPM | WEIGHT: 162 LBS | BODY MASS INDEX: 23.99 KG/M2 | SYSTOLIC BLOOD PRESSURE: 156 MMHG

## 2024-04-08 DIAGNOSIS — Z98.2 PRESENCE OF CEREBROSPINAL FLUID DRAINAGE DEVICE: ICD-10-CM

## 2024-04-08 DIAGNOSIS — G91.2 (IDIOPATHIC) NORMAL PRESSURE HYDROCEPHALUS: ICD-10-CM

## 2024-04-08 DIAGNOSIS — N40.0 BENIGN PROSTATIC HYPERPLASIA WITHOUT LOWER URINARY TRACT SYMPMS: ICD-10-CM

## 2024-04-08 DIAGNOSIS — I10 ESSENTIAL (PRIMARY) HYPERTENSION: ICD-10-CM

## 2024-04-08 DIAGNOSIS — R97.20 ELEVATED PROSTATE, SPECIFIC ANTIGEN [PSA]: ICD-10-CM

## 2024-04-08 PROCEDURE — 99213 OFFICE O/P EST LOW 20 MIN: CPT

## 2024-04-08 NOTE — HISTORY OF PRESENT ILLNESS
[FreeTextEntry1] : All follow up notes reviewed. Dr Garcia follow up noted. No Cancer [de-identified] : New medication started for urinary frequency. Neuro status stable; Vision improved  Medication without change

## 2024-04-08 NOTE — ASSESSMENT
[FreeTextEntry1] : 74 yo male with BPH/LUTS and hx of elevated PSA s/p negative targeted biopsy last Fall.  He has irritative symptoms despite being on an alpha-blocker.  We discussed adding a beta-3 agonist to see if this helps with his frequency.  He is interested in trying Gemtesa 75 mg daily.  He will return in 1 month to discuss his symptoms. He will continue with tamsulosin as well.

## 2024-04-08 NOTE — HISTORY OF PRESENT ILLNESS
[FreeTextEntry1] : 3/14/23: 73 yo male referred for elevated PSA and LUTS. He has a hx of fluctuating PSA and per the patient is s/p TRUS biopsy several years ago which was negative. He may have also had another biopsy at some point after that which was also negative, He has never had a prostate MRI due to claustrophobia. His most recent PSA was from 3/2/23 and was 6.44.   He also complains of frequency and urgency. He is on tamsulosin which helps, but he is mainly bothered by urgency and frequency. He denies dysuria or hematuria.    Of note, he has a hx of stones and is s/p PCNL several years ago with Dr. Huffman.   PVR = 0 cc (LUTS) IPSS = 12; QoL = 3  ************* 6/12/23: Patient returns after shunt placement on 6/14/23. Post-op he was straight cathed x2. He went home and was urinating very small amounts over the weekend. Today it has improved slightly.   PVR: >612 ml (LUTS)  ************ 6/16/23: Patient returns for TOV after going into retention following  shunt placement.   TOV: -120 cc sterile saline instilled in bladder - Catheter remove din its entirety - Patient voided after 2 hours, PVR = 95 cc  *************  6/26/23: Patient returns for TOV. He went into retention on 6/17/23 and had catheter emergently placed in ED.   - 240 cc sterile water instilled in bladder - Catheter remove din its entirety - Patient initially voided 100 cc, he voided an additional 4 times over the course of 4 hours.  Final PVR was 86 cc.  *************** 7/14/23: Patient returns for follow up.  He has been voding well.  He completed his course of abx and feels well and denies any LUTS.  He will schedule his prostate MRI in the next few weeks.   PVR = 15 cc (LUTS)  *************** 8/14/23: Patient returns to review MR prostate.  MRI shows a 75 cc prostate (PSAD = 0.1) with a Left base to mid TZa 1.6 cm PIRADs 5 lesion.  He is voiding well and without complaint.   ************** 4/8/24: Patient returns for follow up.  Prostate biopsy on 9/5/23 was benign.  He is complain of nocturia 3-4 times nightly He is on tamsulosin.  His prostate is 75 cc in size.

## 2024-04-08 NOTE — ASSESSMENT
[FreeTextEntry1] : Doing well; Hopefully new medication will help the urinary frequency. BP stable  No change in other medication  Labs noted from the [facility]

## 2024-05-03 RX ORDER — TAMSULOSIN HYDROCHLORIDE 0.4 MG/1
0.4 CAPSULE ORAL
Qty: 90 | Refills: 3 | Status: ACTIVE | COMMUNITY
Start: 2023-03-02 | End: 1900-01-01

## 2024-05-30 RX ORDER — VIBEGRON 75 MG/1
75 TABLET, FILM COATED ORAL
Qty: 90 | Refills: 3 | Status: ACTIVE | COMMUNITY
Start: 2024-05-30 | End: 1900-01-01

## 2024-05-31 ENCOUNTER — NON-APPOINTMENT (OUTPATIENT)
Age: 74
End: 2024-05-31

## 2024-06-06 ENCOUNTER — NON-APPOINTMENT (OUTPATIENT)
Age: 74
End: 2024-06-06

## 2024-06-06 RX ORDER — MIRABEGRON 25 MG/1
25 TABLET, EXTENDED RELEASE ORAL
Qty: 90 | Refills: 0 | Status: ACTIVE | COMMUNITY
Start: 2024-06-06 | End: 1900-01-01

## 2024-06-10 ENCOUNTER — APPOINTMENT (OUTPATIENT)
Dept: UROLOGY | Facility: CLINIC | Age: 74
End: 2024-06-10
Payer: MEDICARE

## 2024-06-10 VITALS
WEIGHT: 162 LBS | OXYGEN SATURATION: 95 % | DIASTOLIC BLOOD PRESSURE: 80 MMHG | TEMPERATURE: 97.8 F | SYSTOLIC BLOOD PRESSURE: 154 MMHG | HEART RATE: 110 BPM | BODY MASS INDEX: 23.99 KG/M2 | HEIGHT: 69 IN

## 2024-06-10 DIAGNOSIS — N40.1 BENIGN PROSTATIC HYPERPLASIA WITH LOWER URINARY TRACT SYMPMS: ICD-10-CM

## 2024-06-10 DIAGNOSIS — R39.15 URGENCY OF URINATION: ICD-10-CM

## 2024-06-10 DIAGNOSIS — R35.0 FREQUENCY OF MICTURITION: ICD-10-CM

## 2024-06-10 DIAGNOSIS — N13.8 BENIGN PROSTATIC HYPERPLASIA WITH LOWER URINARY TRACT SYMPMS: ICD-10-CM

## 2024-06-10 PROCEDURE — G2211 COMPLEX E/M VISIT ADD ON: CPT

## 2024-06-10 PROCEDURE — 99213 OFFICE O/P EST LOW 20 MIN: CPT

## 2024-06-10 NOTE — PHYSICAL EXAM
[Normal Appearance] : normal appearance [General Appearance - In No Acute Distress] : no acute distress [Heart Rate And Rhythm] : heart rate and rhythm were normal [Abdomen Soft] : soft [Normal Station and Gait] : the gait and station were normal for the patient's age [Skin Color & Pigmentation] : normal skin color and pigmentation [No Focal Deficits] : no focal deficits [Oriented To Time, Place, And Person] : oriented to person, place, and time [Not Anxious] : not anxious

## 2024-06-10 NOTE — ASSESSMENT
[FreeTextEntry1] : 74 yo male with OAB symptoms switching to Myrbetriq from Gemtesa.  He will start at 25 mg daily.  He will let me know in a month of he needs to go up to 50 mg daily.

## 2024-06-10 NOTE — HISTORY OF PRESENT ILLNESS
[FreeTextEntry1] : 3/14/23: 71 yo male referred for elevated PSA and LUTS. He has a hx of fluctuating PSA and per the patient is s/p TRUS biopsy several years ago which was negative. He may have also had another biopsy at some point after that which was also negative, He has never had a prostate MRI due to claustrophobia. His most recent PSA was from 3/2/23 and was 6.44.   He also complains of frequency and urgency. He is on tamsulosin which helps, but he is mainly bothered by urgency and frequency. He denies dysuria or hematuria.    Of note, he has a hx of stones and is s/p PCNL several years ago with Dr. Huffman.   PVR = 0 cc (LUTS) IPSS = 12; QoL = 3  ************* 6/12/23: Patient returns after shunt placement on 6/14/23. Post-op he was straight cathed x2. He went home and was urinating very small amounts over the weekend. Today it has improved slightly.   PVR: >612 ml (LUTS)  ************ 6/16/23: Patient returns for TOV after going into retention following  shunt placement.   TOV: -120 cc sterile saline instilled in bladder - Catheter remove din its entirety - Patient voided after 2 hours, PVR = 95 cc  *************  6/26/23: Patient returns for TOV. He went into retention on 6/17/23 and had catheter emergently placed in ED.   - 240 cc sterile water instilled in bladder - Catheter remove din its entirety - Patient initially voided 100 cc, he voided an additional 4 times over the course of 4 hours.  Final PVR was 86 cc.  *************** 7/14/23: Patient returns for follow up.  He has been voding well.  He completed his course of abx and feels well and denies any LUTS.  He will schedule his prostate MRI in the next few weeks.   PVR = 15 cc (LUTS)  *************** 8/14/23: Patient returns to review MR prostate.  MRI shows a 75 cc prostate (PSAD = 0.1) with a Left base to mid TZa 1.6 cm PIRADs 5 lesion.  He is voiding well and without complaint.   ************** 4/8/24: Patient returns for follow up.  Prostate biopsy on 9/5/23 was benign.  He is complaining of nocturia 3-4 times nightly He is on tamsulosin.  His prostate is 75 cc in size.    *********** 6/10/24: Patient returns for follow up.  He had seen improvement in OAB symptoms on Gemtesa but insurance would not cover it.  Insurance will cover Myrbetriq.  He was started on 25 mg daily but has just picked up the prescription.

## 2024-06-11 ENCOUNTER — NON-APPOINTMENT (OUTPATIENT)
Age: 74
End: 2024-06-11

## 2024-07-06 ENCOUNTER — RX RENEWAL (OUTPATIENT)
Age: 74
End: 2024-07-06

## 2024-07-19 ENCOUNTER — APPOINTMENT (OUTPATIENT)
Dept: NEUROLOGY | Facility: CLINIC | Age: 74
End: 2024-07-19
Payer: MEDICARE

## 2024-07-19 VITALS
WEIGHT: 166 LBS | HEIGHT: 69 IN | BODY MASS INDEX: 24.59 KG/M2 | OXYGEN SATURATION: 97 % | TEMPERATURE: 97.3 F | HEART RATE: 96 BPM

## 2024-07-19 DIAGNOSIS — G20.C PARKINSONISM, UNSPECIFIED: ICD-10-CM

## 2024-07-19 DIAGNOSIS — G91.2 (IDIOPATHIC) NORMAL PRESSURE HYDROCEPHALUS: ICD-10-CM

## 2024-07-19 PROCEDURE — 99214 OFFICE O/P EST MOD 30 MIN: CPT

## 2024-07-19 RX ORDER — CARBIDOPA AND LEVODOPA 25; 100 MG/1; MG/1
25-100 TABLET ORAL 3 TIMES DAILY
Qty: 90 | Refills: 5 | Status: ACTIVE | COMMUNITY
Start: 2024-07-19 | End: 1900-01-01

## 2024-08-05 ENCOUNTER — OUTPATIENT (OUTPATIENT)
Dept: OUTPATIENT SERVICES | Facility: HOSPITAL | Age: 74
LOS: 1 days | End: 2024-08-05
Payer: MEDICARE

## 2024-08-05 ENCOUNTER — RESULT REVIEW (OUTPATIENT)
Age: 74
End: 2024-08-05

## 2024-08-05 DIAGNOSIS — Z98.890 OTHER SPECIFIED POSTPROCEDURAL STATES: Chronic | ICD-10-CM

## 2024-08-05 DIAGNOSIS — Z00.8 ENCOUNTER FOR OTHER GENERAL EXAMINATION: ICD-10-CM

## 2024-08-05 DIAGNOSIS — Z98.2 PRESENCE OF CEREBROSPINAL FLUID DRAINAGE DEVICE: Chronic | ICD-10-CM

## 2024-08-05 DIAGNOSIS — G91.2 (IDIOPATHIC) NORMAL PRESSURE HYDROCEPHALUS: ICD-10-CM

## 2024-08-05 PROCEDURE — 70450 CT HEAD/BRAIN W/O DYE: CPT

## 2024-08-05 PROCEDURE — 70450 CT HEAD/BRAIN W/O DYE: CPT | Mod: 26,MH

## 2024-08-06 DIAGNOSIS — G91.2 (IDIOPATHIC) NORMAL PRESSURE HYDROCEPHALUS: ICD-10-CM

## 2024-08-07 ENCOUNTER — RX RENEWAL (OUTPATIENT)
Age: 74
End: 2024-08-07

## 2024-08-13 PROBLEM — Z86.79 HISTORY OF HYPERTENSION: Status: RESOLVED | Noted: 2023-07-28 | Resolved: 2024-08-13

## 2024-08-13 PROBLEM — Z87.442 HISTORY OF KIDNEY STONES: Status: RESOLVED | Noted: 2023-03-14 | Resolved: 2024-08-13

## 2024-08-16 ENCOUNTER — APPOINTMENT (OUTPATIENT)
Dept: NEUROSURGERY | Facility: CLINIC | Age: 74
End: 2024-08-16

## 2024-08-16 DIAGNOSIS — Z98.2 PRESENCE OF CEREBROSPINAL FLUID DRAINAGE DEVICE: ICD-10-CM

## 2024-08-16 DIAGNOSIS — Z87.442 PERSONAL HISTORY OF URINARY CALCULI: ICD-10-CM

## 2024-08-16 DIAGNOSIS — G91.2 (IDIOPATHIC) NORMAL PRESSURE HYDROCEPHALUS: ICD-10-CM

## 2024-08-16 DIAGNOSIS — Z86.79 PERSONAL HISTORY OF OTHER DISEASES OF THE CIRCULATORY SYSTEM: ICD-10-CM

## 2024-08-16 PROCEDURE — 99443: CPT | Mod: 93

## 2024-08-16 NOTE — REASON FOR VISIT
[Follow-Up: _____] : a [unfilled] follow-up visit [Home] : at home, [unfilled] , at the time of the visit. [Medical Office: ([facility])___] : at the medical office located in  [Patient] : the patient [FreeTextEntry1] : known h/o NPH who is s/p VPS (Certas, last setting @ 7) on 7/7/23 returns to review new CTH wo (PACS) recently evaluated by Dr. Salinas, under treatment for Parkinson's disease otherwise denies any new/worsening focal neuro deficits.

## 2024-08-16 NOTE — DATA REVIEWED
[de-identified] : head wo on 8/5/24 in PACS ACC: [id number]     EXAM:  CT BRAIN   ORDERED BY: HANNA RODGERS  PROCEDURE DATE:  08/05/2024    INTERPRETATION:  CLINICAL INDICATION: Reevaluation of NPH one year after shunt placement..  TECHNIQUE: CT of the head was performed without the administration of intravenous contrast.  COMPARISON: CT head 3/4/2024 and MR head 7/20/2023.  FINDINGS:  Shunt terminating in anterior horn of left lateral ventricle.  (2-18). The width of the frontal horns of the lateral ventricles is 4.2 cm, the width of the skull is 12.4 cm accordingly the evens index is  0.339 consistent with NPH. These measurements are unchanged from the previous CT on 3/4/2024. The sulci are normal for age. There are periventricular patchy low-attenuation likely multifactorial from microvascular ischemic changes as well as NPH.  There is no evidence of acute territorial infarct or intracranial hemorrhage. There is no midline shift.  The visualized intraorbital contents are normal. The imaged portions of the paranasal sinuses are aerated. The mastoid air cells are aerated. The visualized soft tissues and osseous structures appear normal.   IMPRESSION: Since previous CT of 3/4/2024 the shunt remains in place in the left frontal horn of the lateral ventricle. The size of the ventricles are unchanged as well.  --- End of Report ---     SHANTHI VILLARREAL MD; Resident Radiologist This document has been electronically signed. CECE WEAVER MD; Attending Interventional Radiologist This document has been electronically signed. Aug  7 2024  8:49AM

## 2024-08-16 NOTE — ASSESSMENT
[FreeTextEntry1] : stable CTH  PLAN - repeat CTH wo in one year (8/2025) - f/u after images  I, Dr. Yoan Wasserman, personally performed the evaluation and management (E/M) services for this established patient who presents today with (a) new problem(s)/exacerbation of (an) existing condition(s). That E/M includes conducting the clinically appropriate interval history &/or exam, assessing all new/exacerbated conditions, and establishing a new plan of care. Today, my ALESHIA, Hyunchu Lois-Gold, was here to observe my evaluation and management service for this new problem/exacerbated condition and follow the plan of care established by me going forward.

## 2024-08-16 NOTE — DATA REVIEWED
[de-identified] : head wo on 8/5/24 in PACS ACC: [id number]     EXAM:  CT BRAIN   ORDERED BY: HANNA RODGERS  PROCEDURE DATE:  08/05/2024    INTERPRETATION:  CLINICAL INDICATION: Reevaluation of NPH one year after shunt placement..  TECHNIQUE: CT of the head was performed without the administration of intravenous contrast.  COMPARISON: CT head 3/4/2024 and MR head 7/20/2023.  FINDINGS:  Shunt terminating in anterior horn of left lateral ventricle.  (2-18). The width of the frontal horns of the lateral ventricles is 4.2 cm, the width of the skull is 12.4 cm accordingly the evens index is  0.339 consistent with NPH. These measurements are unchanged from the previous CT on 3/4/2024. The sulci are normal for age. There are periventricular patchy low-attenuation likely multifactorial from microvascular ischemic changes as well as NPH.  There is no evidence of acute territorial infarct or intracranial hemorrhage. There is no midline shift.  The visualized intraorbital contents are normal. The imaged portions of the paranasal sinuses are aerated. The mastoid air cells are aerated. The visualized soft tissues and osseous structures appear normal.   IMPRESSION: Since previous CT of 3/4/2024 the shunt remains in place in the left frontal horn of the lateral ventricle. The size of the ventricles are unchanged as well.  --- End of Report ---     SHANTHI VILLARREAL MD; Resident Radiologist This document has been electronically signed. CECE WEAVER MD; Attending Interventional Radiologist This document has been electronically signed. Aug  7 2024  8:49AM

## 2024-08-16 NOTE — REASON FOR VISIT
[Follow-Up: _____] : a [unfilled] follow-up visit [Home] : at home, [unfilled] , at the time of the visit. [Medical Office: ([facility])___] : at the medical office located in  [Patient] : the patient [FreeTextEntry1] : known h/o NPH who is s/p VPS (Certas, last setting @ 7) on 7/7/23 returns to review new CTH wo (PACS) recently evaluated by Dr. Salinas, under treatment for Parkinson's disease otherwise denies any new/worsening focal neuro deficits.

## 2024-09-10 ENCOUNTER — NON-APPOINTMENT (OUTPATIENT)
Age: 74
End: 2024-09-10

## 2024-09-11 RX ORDER — MIRABEGRON 50 MG/1
50 TABLET, EXTENDED RELEASE ORAL
Qty: 90 | Refills: 0 | Status: ACTIVE | COMMUNITY
Start: 2024-09-10 | End: 1900-01-01

## 2024-09-30 ENCOUNTER — APPOINTMENT (OUTPATIENT)
Dept: INTERNAL MEDICINE | Facility: CLINIC | Age: 74
End: 2024-09-30

## 2024-10-03 ENCOUNTER — APPOINTMENT (OUTPATIENT)
Dept: NEUROLOGY | Facility: CLINIC | Age: 74
End: 2024-10-03

## 2024-10-24 ENCOUNTER — APPOINTMENT (OUTPATIENT)
Dept: NEUROLOGY | Facility: CLINIC | Age: 74
End: 2024-10-24

## 2024-11-12 ENCOUNTER — RX RENEWAL (OUTPATIENT)
Age: 74
End: 2024-11-12

## 2024-11-25 ENCOUNTER — APPOINTMENT (OUTPATIENT)
Dept: UROLOGY | Facility: CLINIC | Age: 74
End: 2024-11-25
Payer: MEDICARE

## 2024-11-25 VITALS
TEMPERATURE: 98.4 F | DIASTOLIC BLOOD PRESSURE: 67 MMHG | SYSTOLIC BLOOD PRESSURE: 149 MMHG | BODY MASS INDEX: 24.59 KG/M2 | HEIGHT: 69 IN | HEART RATE: 105 BPM | WEIGHT: 166 LBS

## 2024-11-25 DIAGNOSIS — R97.20 ELEVATED PROSTATE, SPECIFIC ANTIGEN [PSA]: ICD-10-CM

## 2024-11-25 DIAGNOSIS — R39.15 URGENCY OF URINATION: ICD-10-CM

## 2024-11-25 DIAGNOSIS — N13.8 BENIGN PROSTATIC HYPERPLASIA WITH LOWER URINARY TRACT SYMPMS: ICD-10-CM

## 2024-11-25 DIAGNOSIS — R35.0 FREQUENCY OF MICTURITION: ICD-10-CM

## 2024-11-25 DIAGNOSIS — N40.1 BENIGN PROSTATIC HYPERPLASIA WITH LOWER URINARY TRACT SYMPMS: ICD-10-CM

## 2024-11-25 PROCEDURE — G2211 COMPLEX E/M VISIT ADD ON: CPT

## 2024-11-25 PROCEDURE — 99214 OFFICE O/P EST MOD 30 MIN: CPT

## 2024-11-25 PROCEDURE — 51798 US URINE CAPACITY MEASURE: CPT

## 2024-12-02 LAB — PSA SERPL-MCNC: 5.11 NG/ML

## 2024-12-03 ENCOUNTER — NON-APPOINTMENT (OUTPATIENT)
Age: 74
End: 2024-12-03

## 2024-12-05 ENCOUNTER — APPOINTMENT (OUTPATIENT)
Dept: NEUROLOGY | Facility: CLINIC | Age: 74
End: 2024-12-05
Payer: MEDICARE

## 2024-12-05 ENCOUNTER — NON-APPOINTMENT (OUTPATIENT)
Age: 74
End: 2024-12-05

## 2024-12-05 VITALS
TEMPERATURE: 97.1 F | HEART RATE: 103 BPM | HEIGHT: 69 IN | DIASTOLIC BLOOD PRESSURE: 74 MMHG | SYSTOLIC BLOOD PRESSURE: 148 MMHG | OXYGEN SATURATION: 96 % | WEIGHT: 158 LBS | BODY MASS INDEX: 23.4 KG/M2

## 2024-12-05 DIAGNOSIS — R26.81 UNSTEADINESS ON FEET: ICD-10-CM

## 2024-12-05 PROCEDURE — 99215 OFFICE O/P EST HI 40 MIN: CPT

## 2024-12-10 ENCOUNTER — NON-APPOINTMENT (OUTPATIENT)
Age: 74
End: 2024-12-10

## 2024-12-16 ENCOUNTER — NON-APPOINTMENT (OUTPATIENT)
Age: 74
End: 2024-12-16

## 2024-12-27 ENCOUNTER — APPOINTMENT (OUTPATIENT)
Dept: INTERNAL MEDICINE | Facility: CLINIC | Age: 74
End: 2024-12-27
Payer: MEDICARE

## 2024-12-27 VITALS
BODY MASS INDEX: 24.88 KG/M2 | WEIGHT: 168 LBS | HEART RATE: 96 BPM | DIASTOLIC BLOOD PRESSURE: 83 MMHG | SYSTOLIC BLOOD PRESSURE: 159 MMHG | OXYGEN SATURATION: 96 % | TEMPERATURE: 98.1 F | HEIGHT: 69 IN

## 2024-12-27 VITALS — SYSTOLIC BLOOD PRESSURE: 120 MMHG | DIASTOLIC BLOOD PRESSURE: 70 MMHG

## 2024-12-27 DIAGNOSIS — N40.1 BENIGN PROSTATIC HYPERPLASIA WITH LOWER URINARY TRACT SYMPMS: ICD-10-CM

## 2024-12-27 DIAGNOSIS — N13.8 BENIGN PROSTATIC HYPERPLASIA WITH LOWER URINARY TRACT SYMPMS: ICD-10-CM

## 2024-12-27 DIAGNOSIS — I10 ESSENTIAL (PRIMARY) HYPERTENSION: ICD-10-CM

## 2024-12-27 DIAGNOSIS — R26.81 UNSTEADINESS ON FEET: ICD-10-CM

## 2024-12-27 DIAGNOSIS — G20.C PARKINSONISM, UNSPECIFIED: ICD-10-CM

## 2024-12-27 PROCEDURE — 99213 OFFICE O/P EST LOW 20 MIN: CPT

## 2024-12-27 RX ORDER — CARBIDOPA 25 MG/1
150 TABLET ORAL ONCE
Refills: 0 | Status: DISCONTINUED | OUTPATIENT
Start: 2024-12-30 | End: 2025-01-14

## 2024-12-30 ENCOUNTER — RESULT REVIEW (OUTPATIENT)
Age: 74
End: 2024-12-30

## 2024-12-30 ENCOUNTER — APPOINTMENT (OUTPATIENT)
Dept: NUCLEAR MEDICINE | Facility: HOSPITAL | Age: 74
End: 2024-12-30

## 2024-12-30 ENCOUNTER — OUTPATIENT (OUTPATIENT)
Dept: OUTPATIENT SERVICES | Facility: HOSPITAL | Age: 74
LOS: 1 days | End: 2024-12-30
Payer: MEDICARE

## 2024-12-30 DIAGNOSIS — Z98.890 OTHER SPECIFIED POSTPROCEDURAL STATES: Chronic | ICD-10-CM

## 2024-12-30 DIAGNOSIS — Z98.2 PRESENCE OF CEREBROSPINAL FLUID DRAINAGE DEVICE: Chronic | ICD-10-CM

## 2024-12-30 PROCEDURE — A9602: CPT

## 2024-12-30 PROCEDURE — 82962 GLUCOSE BLOOD TEST: CPT

## 2024-12-30 PROCEDURE — 78999 UNLISTED MISC PX DX NUC MED: CPT | Mod: 26,MD

## 2024-12-30 PROCEDURE — 78814 PET IMAGE W/CT LMTD: CPT | Mod: MD

## 2024-12-30 PROCEDURE — 78999 UNLISTED MISC PX DX NUC MED: CPT | Mod: MD

## 2024-12-30 PROCEDURE — 78814 PET IMAGE W/CT LMTD: CPT | Mod: 26

## 2025-01-16 ENCOUNTER — EMERGENCY (EMERGENCY)
Facility: HOSPITAL | Age: 75
LOS: 0 days | Discharge: HOME | End: 2025-01-16
Attending: STUDENT IN AN ORGANIZED HEALTH CARE EDUCATION/TRAINING PROGRAM
Payer: MEDICARE

## 2025-01-16 VITALS
OXYGEN SATURATION: 97 % | HEIGHT: 71 IN | DIASTOLIC BLOOD PRESSURE: 116 MMHG | SYSTOLIC BLOOD PRESSURE: 198 MMHG | RESPIRATION RATE: 18 BRPM | WEIGHT: 179.9 LBS | TEMPERATURE: 98 F | HEART RATE: 100 BPM

## 2025-01-16 DIAGNOSIS — Z98.890 OTHER SPECIFIED POSTPROCEDURAL STATES: Chronic | ICD-10-CM

## 2025-01-16 DIAGNOSIS — Z98.2 PRESENCE OF CEREBROSPINAL FLUID DRAINAGE DEVICE: Chronic | ICD-10-CM

## 2025-01-16 PROCEDURE — 72125 CT NECK SPINE W/O DYE: CPT | Mod: MC

## 2025-01-16 PROCEDURE — 72125 CT NECK SPINE W/O DYE: CPT | Mod: 26

## 2025-01-16 PROCEDURE — 70450 CT HEAD/BRAIN W/O DYE: CPT | Mod: 26

## 2025-01-16 PROCEDURE — 72192 CT PELVIS W/O DYE: CPT | Mod: MC

## 2025-01-16 PROCEDURE — 72192 CT PELVIS W/O DYE: CPT | Mod: 26

## 2025-01-16 PROCEDURE — 99284 EMERGENCY DEPT VISIT MOD MDM: CPT

## 2025-01-16 PROCEDURE — 99284 EMERGENCY DEPT VISIT MOD MDM: CPT | Mod: 25

## 2025-01-16 PROCEDURE — 70450 CT HEAD/BRAIN W/O DYE: CPT | Mod: MC

## 2025-01-16 NOTE — ED PROVIDER NOTE - NSFOLLOWUPCLINICS_GEN_ALL_ED_FT
[facility]  ENT  [address], [address]nd [place] [address]  Phone: [phone]  Fax:   Follow Up Time: 1-3 Days     Clindamycin Pregnancy And Lactation Text: This medication can be used in pregnancy if certain situations. Clindamycin is also present in breast milk.

## 2025-01-16 NOTE — ED ADULT NURSE NOTE - NSFALLUNIVINTERV_ED_ALL_ED
Bed/Stretcher in lowest position, wheels locked, appropriate side rails in place/Call bell, personal items and telephone in reach/Instruct patient to call for assistance before getting out of bed/chair/stretcher/Non-slip footwear applied when patient is off stretcher/[place] to call system/Physically safe environment - no spills, clutter or unnecessary equipment/Purposeful proactive rounding/Room/bathroom lighting operational, light cord in reach

## 2025-01-16 NOTE — ED ADULT NURSE NOTE - ISOLATION TYPE:
Informed patient of other options: physical therapy or epidural injection if he did not want to be placed on Tramadol pain medication. Patient states he will keep OV on 10/25/22 at 2:30 pm to discuss possibly starting epidural injections. None

## 2025-01-16 NOTE — ED PROVIDER NOTE - PATIENT PORTAL LINK FT
You can access the FollowMyHealth Patient Portal offered by [facility] by registering at the following website: [email or web address]. By joining [facility]’s FollowMyHealth portal, you will also be able to view your health information using other applications (apps) compatible with our system.

## 2025-01-16 NOTE — ED PROVIDER NOTE - PHYSICAL EXAMINATION
VITAL SIGNS: I have reviewed nursing notes and confirm.  CONSTITUTIONAL: Well-developed; well-nourished; in no acute distress.  SKIN: Skin exam is warm and dry, no acute rash.  HEAD: Superior occipital scalp hematoma with small abrasion.  Otherwise NCAT without raccoon eyes or Torres sign.  EYES: PERRL, conjunctiva and sclera clear.  ENT: mmm, no dental trauma noted  NECK: Supple; non tender.  CARD: S1, S2 normal; no murmurs, gallops, or rubs. Regular rate and rhythm.  RESP: Normal respiratory effort, no tachypnea or distress. Lungs CTAB, no wheezes, rales or rhonchi.  ABD: soft, NT/ND.  EXT: Normal ROM. No clubbing, cyanosis or edema.  NEURO: Alert, oriented. Grossly unremarkable. No focal deficits. Strength 5 out of 5 X4 extremities.  Patient with slow, shuffling gait but daughter-in-law states this is his baseline.  PSYCH: Cooperative, appropriate.

## 2025-01-16 NOTE — ED PROVIDER NOTE - NSFOLLOWUPINSTRUCTIONS_ED_ALL_ED_FT
Our Emergency Department Referral Coordinators will be reaching out to you in the next 24-48 hours from 9:00am to 5:00pm with a follow up appointment. Please expect a phone call from the hospital in that time frame. If you do not receive a call or if you have any questions or concerns, you can reach them at   [phone] (ENT)    ----------------------------------------------      Head Injury, Adult  Three rear views of the head showing how quick, sudden head movements injure the brain.  There are many types of head injuries. Head injuries can be as minor as a small bump, or they can be a serious medical issue. More severe head injuries include:  A jarring injury to the brain (concussion).  A bruise (contusion) of the brain. This means there is bleeding in the brain that can cause swelling.  A cracked skull (skull fracture).  Bleeding in the brain that collects, clots, and forms a bump (hematoma).  After a head injury, most problems occur within the first 24 hours, but side effects may occur up to 7–10 days after the injury. It is important to watch your condition for any changes. You may need to be observed in the emergency department or urgent care, or you may have to stay in the hospital.    What are the causes?  There are many causes of a head injury. Serious head injuries may be caused by car crashes, bicycle or motorcycle crashes, sports injuries, falls, or being struck by an object.    What are the symptoms?  Symptoms of a head injury include a contusion, bump, or bleeding at the site of the injury. Other physical symptoms may include:  Headache.  Nausea or vomiting.  Dizziness.  Blurred or double vision.  Sensitivity to bright lights or loud noises.  Feeling tired.  Trouble waking up.  Severe symptoms such as:  Weakness or numbness on one side of the body.  Slurred speech or swallowing problems.  Loss of consciousness.  Seizures.  Mental symptoms may include:  Irritability.  Confusion and memory problems.  Poor attention and concentration.  Changes in eating or sleeping habits.  Anxiety or depression.  How is this diagnosed?  This condition is diagnosed based on your symptoms and a physical exam. You may also have imaging tests done, such as a CT scan or an MRI.    How is this treated?  Treatment for this condition depends on the severity and type of injury you have. The main goal of treatment is to prevent complications and allow the brain time to heal.    Mild head injury    If you have a mild head injury, you may be sent home, and treatment may include:  Observation. A responsible adult should stay with you for 24 hours after your injury and check on you often.  Physical rest.  Brain rest.  Pain medicines.  Severe head injury    If you have a severe head injury, treatment may include:  Close observation. You may have to stay in the hospital and have:  Frequent physical exams.  Frequent checks of how your brain and nervous system are working.  Your blood pressure and oxygen levels checked.  Medicines to relieve pain, prevent seizures, and decrease brain swelling.  Airway protection and breathing support. This may include using a ventilator.  Monitoring and managing swelling inside the brain.  Brain surgery. Surgery may include:  Removing a collection of blood or blood clots.  Stopping the bleeding.  Removing a part of the skull to make room for the brain to swell.  Follow these instructions at home:  Activity    Rest. Avoid activities that are hard or tiring.  Make sure you get enough sleep.  Let your brain rest by limiting activities that take a lot of thought or attention, such as:  Watching TV.  Playing memory games and doing puzzles.  Job-related work or homework.  Working on the computer, using social media, and texting.  Avoid activities that could cause another head injury, such as playing sports, until your health care provider approves.  Ask your provider when it is safe for you to return to your regular activities, such as work or school.  Ask your provider when you can drive, ride a bicycle, or use machinery. Your ability to react may be slower after a brain injury. Do not do these activities if you are dizzy.  Lifestyle    A sign telling the reader not to drink beer, wine, or hard liquor.  Do not drink alcohol until your provider approves. Do not use drugs. Alcohol and certain drugs may slow your recovery and can put you at risk of further injury.  If it is hard to remember things, write them down.  If you are easily distracted, try to do one thing at a time.  Talk with family members or close friends when making important decisions.  Tell your friends, family, a trusted colleague, and  about your injury, symptoms, and restrictions. Ask them to watch for any problems that are new or get worse.  General instructions    Take over-the-counter and prescription medicines only as told by your provider.  Have a responsible adult stay with you for 24 hours after your head injury. They should watch you for any changes in your symptoms and be ready to get help right away.  Keep all follow-up visits to make sure your needs are being met and catch any new problems early.  How is this prevented?  Avoiding another brain injury is very important. In rare cases, another injury can lead to permanent brain damage, brain swelling, or death. The risk of this is greatest during the first 7–10 days after a head injury. To avoid injuries:  Improve your balance and strength to avoid falls.  Wear a seat belt when you are in a moving vehicle.  Wear a helmet when riding a bicycle, skiing, or doing any other sport that has a risk of injury.  Take safety measures in your home to prevent falls, such as:  Removing clutter and tripping hazards.  Using grab bars in bathrooms and handrails by stairs.  Placing non-slip mats on floors and in bathtubs.  Improving lighting in dim areas.  Where to find more information  Brain Injury Association: biausa.org  Contact a health care provider if:  You have headaches that do not go away.  You have dizziness that does not go away.  You have double vision or vision changes that do not go away.  You have difficulty sleeping.  You have changes in your mood.  You have new symptoms.  Get help right away if:  You have sudden:  Severe headache.  Severe vomiting.  Unequal pupil size. One is bigger than the other.  Vision problems.  Confusion or irritability.  You have a seizure.  Your symptoms get worse.  You have clear or bloody fluid coming from your nose or ears.  These symptoms may be an emergency. Get help right away. Call 911.  Do not wait to see if the symptoms will go away.  Do not drive yourself to the hospital.  This information is not intended to replace advice given to you by your health care provider. Make sure you discuss any questions you have with your health care provider.

## 2025-01-16 NOTE — ED ADULT TRIAGE NOTE - HEIGHT IN CM
Occupational [address] [employer]  Occupational Therapy Not Seen Note    DATE: 6/3/2022    NAME: Skip Hart  MRN: [id number]   : 1990      Patient not seen this date for Occupational Therapy due to:    Patient independent with ADLs and functional tasks with no acute OT needs. Will defer OT evaluation at this time. Please reorder OT if future needs arise. Pt in agreement.      Electronically signed by WILBERT Silva/L on 6/3/2022 at 4:52 PM 180.34

## 2025-01-16 NOTE — ED ADULT TRIAGE NOTE - CHIEF COMPLAINT QUOTE
BIBA, as per EMS "pt mechanical fell in parking lot, No LOC, c/o back pain (chronic), hit back of head, presents with hematoma, not on blood thinners"

## 2025-01-16 NOTE — ED PROVIDER NOTE - CLINICAL SUMMARY MEDICAL DECISION MAKING FREE TEXT BOX
74-year-old M with PMH of hydrocephalus s/p  shunt, BPH, chronic back pain, parkinsonism, no anticoagulation, presents to ED after fall + Head CT,    pt feeling better, labs imaging reviewed    Appropriate medications for patient's presenting complaints were ordered and effects were reassessed. Patient's external records were reviewed    Escalation to admission and/or observation was considered.  Patient feels much better and is comfortable with discharge.  Appropriate follow-up was arranged.

## 2025-01-16 NOTE — ED PROVIDER NOTE - OBJECTIVE STATEMENT
74-year-old M with PMH of hydrocephalus s/p  shunt, BPH, chronic back pain, parkinsonism, no anticoagulation, presents to ED after fall with head strike.  Patient states he was at the gym today when he was walking and felt a sharp sudden pain in his back typical of his chronic back pain, and leaned against a wall, missed a step and fell onto his back then hit his head on the ground.  Reports no LOC but notes hematoma to the site.  Now complaining of pain to bilateral neck but has no other complaints at this time.  No dizziness, syncope, CP, SOB, abdominal pain, N/V/D, extremity numbness/weakness/paresthesias.  Patient states he is supposed to ambulate with a walker but does not. Unknown last TDAP but refusing at this time.
no

## 2025-01-16 NOTE — ED PROVIDER NOTE - NSICDXPASTMEDICALHX_GEN_ALL_CORE_FT
PAST MEDICAL HISTORY:  2019 novel coronavirus disease (COVID-19)     Ankle swelling     Balance problem     GERD (gastroesophageal reflux disease) resolved    H/O hydrocephalus     H/O sinusitis     History of BPH     HTN (hypertension)     Kidney stone SURGERY DONE    MP (myelopathy)

## 2025-01-16 NOTE — ED PROVIDER NOTE - PROGRESS NOTE DETAILS
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  He is asymptomatic currently.  Given a walker and is ambulating without difficulty in the emergency room.  Will discharge home to follow-up with ENT rapid referral for tongue mass that was seen on CT but not on my repeat examination with tongue depressor and with his neurologist, he has the number and information at home.    Patient to be discharged from ED. Any available test results were discussed with patient and/or family. Verbal instructions given, including instructions to return to ED immediately for any new, worsening, or concerning symptoms. Patient endorsed understanding. Written discharge instructions additionally given, including follow-up plan.

## 2025-01-16 NOTE — ED PROVIDER NOTE - NSICDXPASTSURGICALHX_GEN_ALL_CORE_FT
PAST SURGICAL HISTORY:  H/O lithotripsy     H/O prostate biopsy     History of surgery RIGHT TESTICLE CYST REMOVED    S/P  shunt

## 2025-01-16 NOTE — ED ADULT NURSE NOTE - MODE OF DISCHARGE
Spoke to patient. Past due for follow up. Advised patient he really needed to be seen d/t uncontrolled BP. Patient scheduled appointment for tomorrow.    Ambulatory

## 2025-01-17 NOTE — CHART NOTE - NSCHARTNOTEFT_GEN_A_CORE
Patient prefers to follow up with PCP first. Will call back if assistance is needed (ENT referral) CT 1/17.

## 2025-01-23 ENCOUNTER — NON-APPOINTMENT (OUTPATIENT)
Age: 75
End: 2025-01-23

## 2025-01-23 ENCOUNTER — APPOINTMENT (OUTPATIENT)
Dept: OTOLARYNGOLOGY | Facility: CLINIC | Age: 75
End: 2025-01-23
Payer: MEDICARE

## 2025-01-23 VITALS — BODY MASS INDEX: 24.88 KG/M2 | HEIGHT: 69 IN | WEIGHT: 168 LBS

## 2025-01-23 DIAGNOSIS — J39.2 OTHER DISEASES OF PHARYNX: ICD-10-CM

## 2025-01-23 DIAGNOSIS — R42 DIZZINESS AND GIDDINESS: ICD-10-CM

## 2025-01-23 PROCEDURE — 31575 DIAGNOSTIC LARYNGOSCOPY: CPT

## 2025-01-23 PROCEDURE — 99214 OFFICE O/P EST MOD 30 MIN: CPT | Mod: 25

## 2025-01-23 RX ORDER — MECLIZINE HYDROCHLORIDE 12.5 MG/1
12.5 TABLET ORAL 3 TIMES DAILY
Qty: 20 | Refills: 1 | Status: ACTIVE | COMMUNITY
Start: 2025-01-23 | End: 1900-01-01

## 2025-01-27 ENCOUNTER — APPOINTMENT (OUTPATIENT)
Dept: OTOLARYNGOLOGY | Facility: CLINIC | Age: 75
End: 2025-01-27
Payer: MEDICARE

## 2025-01-27 PROCEDURE — 31576 LARYNGOSCOPY WITH BIOPSY: CPT

## 2025-01-28 ENCOUNTER — OUTPATIENT (OUTPATIENT)
Dept: OUTPATIENT SERVICES | Facility: HOSPITAL | Age: 75
LOS: 1 days | End: 2025-01-28
Payer: MEDICARE

## 2025-01-28 DIAGNOSIS — Z98.890 OTHER SPECIFIED POSTPROCEDURAL STATES: Chronic | ICD-10-CM

## 2025-01-28 DIAGNOSIS — Z98.2 PRESENCE OF CEREBROSPINAL FLUID DRAINAGE DEVICE: Chronic | ICD-10-CM

## 2025-01-28 DIAGNOSIS — J39.2 OTHER DISEASES OF PHARYNX: ICD-10-CM

## 2025-01-28 PROCEDURE — 70491 CT SOFT TISSUE NECK W/DYE: CPT | Mod: 26

## 2025-01-28 PROCEDURE — 70491 CT SOFT TISSUE NECK W/DYE: CPT

## 2025-01-28 NOTE — ED ADULT TRIAGE NOTE - NS ED NURSE DIRECT TO ROOM YN
admitted with pulmonary embolism without acute cor pulmonale Admitted for PE admitted with pulmonary embolism with out acute cor pulmonale No

## 2025-01-29 DIAGNOSIS — J39.2 OTHER DISEASES OF PHARYNX: ICD-10-CM

## 2025-02-04 LAB — CORE LAB BIOPSY: NORMAL

## 2025-02-10 ENCOUNTER — NON-APPOINTMENT (OUTPATIENT)
Age: 75
End: 2025-02-10

## 2025-02-11 ENCOUNTER — RESULT REVIEW (OUTPATIENT)
Age: 75
End: 2025-02-11

## 2025-02-11 ENCOUNTER — OUTPATIENT (OUTPATIENT)
Dept: OUTPATIENT SERVICES | Facility: HOSPITAL | Age: 75
LOS: 1 days | End: 2025-02-11
Payer: MEDICARE

## 2025-02-11 DIAGNOSIS — Z98.2 PRESENCE OF CEREBROSPINAL FLUID DRAINAGE DEVICE: Chronic | ICD-10-CM

## 2025-02-11 DIAGNOSIS — C01 MALIGNANT NEOPLASM OF BASE OF TONGUE: ICD-10-CM

## 2025-02-11 DIAGNOSIS — Z98.890 OTHER SPECIFIED POSTPROCEDURAL STATES: Chronic | ICD-10-CM

## 2025-02-11 DIAGNOSIS — C77.0 SECONDARY AND UNSPECIFIED MALIGNANT NEOPLASM OF LYMPH NODES OF HEAD, FACE AND NECK: ICD-10-CM

## 2025-02-11 LAB — GLUCOSE BLDC GLUCOMTR-MCNC: 109 MG/DL — HIGH (ref 70–99)

## 2025-02-11 PROCEDURE — 78815 PET IMAGE W/CT SKULL-THIGH: CPT | Mod: 26,PI

## 2025-02-11 PROCEDURE — 82962 GLUCOSE BLOOD TEST: CPT

## 2025-02-11 PROCEDURE — A9552: CPT

## 2025-02-11 PROCEDURE — 78815 PET IMAGE W/CT SKULL-THIGH: CPT | Mod: PI

## 2025-02-12 ENCOUNTER — APPOINTMENT (OUTPATIENT)
Dept: OTOLARYNGOLOGY | Facility: CLINIC | Age: 75
End: 2025-02-12

## 2025-02-12 DIAGNOSIS — C01 MALIGNANT NEOPLASM OF BASE OF TONGUE: ICD-10-CM

## 2025-02-13 ENCOUNTER — NON-APPOINTMENT (OUTPATIENT)
Age: 75
End: 2025-02-13

## 2025-02-14 ENCOUNTER — APPOINTMENT (OUTPATIENT)
Dept: OTOLARYNGOLOGY | Facility: CLINIC | Age: 75
End: 2025-02-14
Payer: MEDICARE

## 2025-02-14 VITALS — WEIGHT: 175 LBS | HEIGHT: 69 IN | BODY MASS INDEX: 25.92 KG/M2

## 2025-02-14 DIAGNOSIS — C77.0 SECONDARY AND UNSPECIFIED MALIGNANT NEOPLASM OF LYMPH NODES OF HEAD, FACE AND NECK: ICD-10-CM

## 2025-02-14 DIAGNOSIS — C01 MALIGNANT NEOPLASM OF BASE OF TONGUE: ICD-10-CM

## 2025-02-14 DIAGNOSIS — J39.2 OTHER DISEASES OF PHARYNX: ICD-10-CM

## 2025-02-14 DIAGNOSIS — H61.21 IMPACTED CERUMEN, RIGHT EAR: ICD-10-CM

## 2025-02-14 PROCEDURE — 99215 OFFICE O/P EST HI 40 MIN: CPT

## 2025-02-14 PROCEDURE — G2211 COMPLEX E/M VISIT ADD ON: CPT

## 2025-03-03 ENCOUNTER — NON-APPOINTMENT (OUTPATIENT)
Age: 75
End: 2025-03-03

## 2025-03-04 ENCOUNTER — APPOINTMENT (OUTPATIENT)
Dept: OTOLARYNGOLOGY | Facility: CLINIC | Age: 75
End: 2025-03-04
Payer: MEDICARE

## 2025-03-04 VITALS
SYSTOLIC BLOOD PRESSURE: 137 MMHG | DIASTOLIC BLOOD PRESSURE: 78 MMHG | WEIGHT: 175 LBS | TEMPERATURE: 98.3 F | HEART RATE: 104 BPM | HEIGHT: 69 IN | BODY MASS INDEX: 25.92 KG/M2 | OXYGEN SATURATION: 97 %

## 2025-03-04 DIAGNOSIS — Z78.9 OTHER SPECIFIED HEALTH STATUS: ICD-10-CM

## 2025-03-04 PROCEDURE — 31575 DIAGNOSTIC LARYNGOSCOPY: CPT

## 2025-03-04 PROCEDURE — 99214 OFFICE O/P EST MOD 30 MIN: CPT | Mod: 25

## 2025-03-06 ENCOUNTER — APPOINTMENT (OUTPATIENT)
Dept: OTOLARYNGOLOGY | Facility: CLINIC | Age: 75
End: 2025-03-06
Payer: MEDICARE

## 2025-03-06 VITALS — HEIGHT: 69 IN | BODY MASS INDEX: 25.92 KG/M2 | WEIGHT: 175 LBS

## 2025-03-06 DIAGNOSIS — C77.0 SECONDARY AND UNSPECIFIED MALIGNANT NEOPLASM OF LYMPH NODES OF HEAD, FACE AND NECK: ICD-10-CM

## 2025-03-06 DIAGNOSIS — C01 MALIGNANT NEOPLASM OF BASE OF TONGUE: ICD-10-CM

## 2025-03-06 PROCEDURE — 99214 OFFICE O/P EST MOD 30 MIN: CPT

## 2025-03-06 PROCEDURE — G2211 COMPLEX E/M VISIT ADD ON: CPT

## 2025-03-27 ENCOUNTER — APPOINTMENT (OUTPATIENT)
Dept: NEUROLOGY | Facility: CLINIC | Age: 75
End: 2025-03-27
Payer: MEDICARE

## 2025-03-27 VITALS
TEMPERATURE: 97.3 F | SYSTOLIC BLOOD PRESSURE: 170 MMHG | WEIGHT: 168 LBS | OXYGEN SATURATION: 97 % | DIASTOLIC BLOOD PRESSURE: 84 MMHG | HEIGHT: 69 IN | BODY MASS INDEX: 24.88 KG/M2 | HEART RATE: 85 BPM

## 2025-03-27 PROCEDURE — 99214 OFFICE O/P EST MOD 30 MIN: CPT

## 2025-04-16 ENCOUNTER — RX RENEWAL (OUTPATIENT)
Age: 75
End: 2025-04-16

## 2025-04-22 ENCOUNTER — NON-APPOINTMENT (OUTPATIENT)
Age: 75
End: 2025-04-22

## 2025-06-03 ENCOUNTER — APPOINTMENT (OUTPATIENT)
Dept: UROLOGY | Facility: CLINIC | Age: 75
End: 2025-06-03

## 2025-07-03 ENCOUNTER — APPOINTMENT (OUTPATIENT)
Dept: NEUROLOGY | Facility: CLINIC | Age: 75
End: 2025-07-03
Payer: MEDICARE

## 2025-07-03 PROCEDURE — 99214 OFFICE O/P EST MOD 30 MIN: CPT

## 2025-09-10 ENCOUNTER — APPOINTMENT (OUTPATIENT)
Dept: INTERNAL MEDICINE | Facility: CLINIC | Age: 75
End: 2025-09-10
Payer: MEDICARE

## 2025-09-10 VITALS
WEIGHT: 143 LBS | HEART RATE: 78 BPM | DIASTOLIC BLOOD PRESSURE: 76 MMHG | TEMPERATURE: 97.9 F | HEIGHT: 69 IN | OXYGEN SATURATION: 97 % | SYSTOLIC BLOOD PRESSURE: 145 MMHG | BODY MASS INDEX: 21.18 KG/M2

## 2025-09-10 DIAGNOSIS — R26.81 UNSTEADINESS ON FEET: ICD-10-CM

## 2025-09-10 DIAGNOSIS — G20.C PARKINSONISM, UNSPECIFIED: ICD-10-CM

## 2025-09-10 DIAGNOSIS — R26.89 OTHER ABNORMALITIES OF GAIT AND MOBILITY: ICD-10-CM

## 2025-09-10 DIAGNOSIS — R35.0 FREQUENCY OF MICTURITION: ICD-10-CM

## 2025-09-10 DIAGNOSIS — I10 ESSENTIAL (PRIMARY) HYPERTENSION: ICD-10-CM

## 2025-09-10 DIAGNOSIS — C01 MALIGNANT NEOPLASM OF BASE OF TONGUE: ICD-10-CM

## 2025-09-10 DIAGNOSIS — G91.2 (IDIOPATHIC) NORMAL PRESSURE HYDROCEPHALUS: ICD-10-CM

## 2025-09-10 PROCEDURE — 99214 OFFICE O/P EST MOD 30 MIN: CPT

## 2025-09-10 PROCEDURE — 36415 COLL VENOUS BLD VENIPUNCTURE: CPT

## 2025-09-18 ENCOUNTER — EMERGENCY (EMERGENCY)
Facility: HOSPITAL | Age: 75
LOS: 0 days | Discharge: HOME | End: 2025-09-18
Attending: EMERGENCY MEDICINE
Payer: MEDICARE

## 2025-09-18 VITALS
OXYGEN SATURATION: 100 % | WEIGHT: 160.94 LBS | RESPIRATION RATE: 18 BRPM | HEART RATE: 105 BPM | TEMPERATURE: 98 F | SYSTOLIC BLOOD PRESSURE: 151 MMHG | DIASTOLIC BLOOD PRESSURE: 78 MMHG

## 2025-09-18 DIAGNOSIS — S12.601A UNSPECIFIED NONDISPLACED FRACTURE OF SEVENTH CERVICAL VERTEBRA, INITIAL ENCOUNTER FOR CLOSED FRACTURE: ICD-10-CM

## 2025-09-18 DIAGNOSIS — Z98.890 OTHER SPECIFIED POSTPROCEDURAL STATES: Chronic | ICD-10-CM

## 2025-09-18 DIAGNOSIS — Z98.2 PRESENCE OF CEREBROSPINAL FLUID DRAINAGE DEVICE: Chronic | ICD-10-CM

## 2025-09-18 DIAGNOSIS — S09.90XA UNSPECIFIED INJURY OF HEAD, INITIAL ENCOUNTER: ICD-10-CM
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
## 2025-09-18 LAB
ALBUMIN SERPL ELPH-MCNC: 4.2 G/DL — SIGNIFICANT CHANGE UP (ref 3.5–5.2)
ALP SERPL-CCNC: 61 U/L — SIGNIFICANT CHANGE UP (ref 30–115)
ALT FLD-CCNC: 9 U/L — SIGNIFICANT CHANGE UP (ref 0–41)
ANION GAP SERPL CALC-SCNC: 13 MMOL/L — SIGNIFICANT CHANGE UP (ref 7–14)
APPEARANCE UR: CLEAR — SIGNIFICANT CHANGE UP
APTT BLD: 30.2 SEC — SIGNIFICANT CHANGE UP (ref 27–39.2)
AST SERPL-CCNC: 10 U/L — SIGNIFICANT CHANGE UP (ref 0–41)
BASOPHILS # BLD AUTO: 0.04 K/UL — SIGNIFICANT CHANGE UP (ref 0–0.2)
BASOPHILS NFR BLD AUTO: 0.4 % — SIGNIFICANT CHANGE UP (ref 0–2)
BILIRUB SERPL-MCNC: 0.8 MG/DL — SIGNIFICANT CHANGE UP (ref 0.2–1.2)
BILIRUB UR-MCNC: NEGATIVE — SIGNIFICANT CHANGE UP
BUN SERPL-MCNC: 26 MG/DL — HIGH (ref 10–20)
CALCIUM SERPL-MCNC: 9.6 MG/DL — SIGNIFICANT CHANGE UP (ref 8.4–10.5)
CHLORIDE SERPL-SCNC: 103 MMOL/L — SIGNIFICANT CHANGE UP (ref 98–110)
CK SERPL-CCNC: 89 U/L — SIGNIFICANT CHANGE UP (ref 0–225)
CO2 SERPL-SCNC: 24 MMOL/L — SIGNIFICANT CHANGE UP (ref 17–32)
COLOR SPEC: YELLOW — SIGNIFICANT CHANGE UP
CREAT SERPL-MCNC: 0.8 MG/DL — SIGNIFICANT CHANGE UP (ref 0.7–1.5)
DIFF PNL FLD: NEGATIVE — SIGNIFICANT CHANGE UP
EGFR: 91 ML/MIN/1.73M2 — SIGNIFICANT CHANGE UP
EGFR: 91 ML/MIN/1.73M2 — SIGNIFICANT CHANGE UP
EOSINOPHIL # BLD AUTO: 0.05 K/UL — SIGNIFICANT CHANGE UP (ref 0–0.5)
EOSINOPHIL NFR BLD AUTO: 0.5 % — SIGNIFICANT CHANGE UP (ref 0–6)
FLUAV AG NPH QL: SIGNIFICANT CHANGE UP
FLUBV AG NPH QL: SIGNIFICANT CHANGE UP
GLUCOSE SERPL-MCNC: 114 MG/DL — HIGH (ref 70–99)
GLUCOSE UR QL: NEGATIVE MG/DL — SIGNIFICANT CHANGE UP
HCT VFR BLD CALC: 42.1 % — SIGNIFICANT CHANGE UP (ref 39–50)
HGB BLD-MCNC: 14.5 G/DL — SIGNIFICANT CHANGE UP (ref 13–17)
IMM GRANULOCYTES # BLD AUTO: 0.04 K/UL — SIGNIFICANT CHANGE UP (ref 0–0.07)
IMM GRANULOCYTES NFR BLD AUTO: 0.4 % — SIGNIFICANT CHANGE UP (ref 0–0.9)
INR BLD: 1.13 RATIO — SIGNIFICANT CHANGE UP (ref 0.65–1.3)
KETONES UR QL: ABNORMAL MG/DL
LACTATE SERPL-SCNC: 0.8 MMOL/L — SIGNIFICANT CHANGE UP (ref 0.7–2)
LEUKOCYTE ESTERASE UR-ACNC: NEGATIVE — SIGNIFICANT CHANGE UP
LYMPHOCYTES # BLD AUTO: 0.45 K/UL — LOW (ref 1–3.3)
LYMPHOCYTES NFR BLD AUTO: 4.3 % — LOW (ref 13–44)
MCHC RBC-ENTMCNC: 32.1 PG — SIGNIFICANT CHANGE UP (ref 27–34)
MCHC RBC-ENTMCNC: 34.4 G/DL — SIGNIFICANT CHANGE UP (ref 32–36)
MCV RBC AUTO: 93.1 FL — SIGNIFICANT CHANGE UP (ref 80–100)
MONOCYTES # BLD AUTO: 0.7 K/UL — SIGNIFICANT CHANGE UP (ref 0–0.9)
MONOCYTES NFR BLD AUTO: 6.7 % — SIGNIFICANT CHANGE UP (ref 2–14)
NEUTROPHILS # BLD AUTO: 9.11 K/UL — HIGH (ref 1.8–7.4)
NEUTROPHILS NFR BLD AUTO: 87.7 % — HIGH (ref 43–77)
NITRITE UR-MCNC: NEGATIVE — SIGNIFICANT CHANGE UP
NRBC # BLD AUTO: 0 K/UL — SIGNIFICANT CHANGE UP (ref 0–0)
NRBC # FLD: 0 K/UL — SIGNIFICANT CHANGE UP (ref 0–0)
NRBC BLD AUTO-RTO: 0 /100 WBCS — SIGNIFICANT CHANGE UP (ref 0–0)
PH UR: 6 — SIGNIFICANT CHANGE UP (ref 5–8)
PLATELET # BLD AUTO: 253 K/UL — SIGNIFICANT CHANGE UP (ref 150–400)
PMV BLD: 8.8 FL — SIGNIFICANT CHANGE UP (ref 7–13)
POTASSIUM SERPL-MCNC: 3.9 MMOL/L — SIGNIFICANT CHANGE UP (ref 3.5–5)
POTASSIUM SERPL-SCNC: 3.9 MMOL/L — SIGNIFICANT CHANGE UP (ref 3.5–5)
PROT SERPL-MCNC: 6.7 G/DL — SIGNIFICANT CHANGE UP (ref 6–8)
PROT UR-MCNC: SIGNIFICANT CHANGE UP MG/DL
PROTHROM AB SERPL-ACNC: 13.4 SEC — HIGH (ref 9.95–12.87)
RBC # BLD: 4.52 M/UL — SIGNIFICANT CHANGE UP (ref 4.2–5.8)
RBC # FLD: 11.9 % — SIGNIFICANT CHANGE UP (ref 10.3–14.5)
RSV RNA NPH QL NAA+NON-PROBE: SIGNIFICANT CHANGE UP
SARS-COV-2 RNA SPEC QL NAA+PROBE: SIGNIFICANT CHANGE UP
SODIUM SERPL-SCNC: 140 MMOL/L — SIGNIFICANT CHANGE UP (ref 135–146)
SOURCE RESPIRATORY: SIGNIFICANT CHANGE UP
SP GR SPEC: 1.02 — SIGNIFICANT CHANGE UP (ref 1–1.03)
TROPONIN T, HIGH SENSITIVITY RESULT: 12 NG/L — SIGNIFICANT CHANGE UP (ref 6–21)
TROPONIN T, HIGH SENSITIVITY RESULT: 12 NG/L — SIGNIFICANT CHANGE UP (ref 6–21)
UROBILINOGEN FLD QL: 1 MG/DL — SIGNIFICANT CHANGE UP (ref 0.2–1)
WBC # BLD: 10.39 K/UL — SIGNIFICANT CHANGE UP (ref 3.8–10.5)
WBC # FLD AUTO: 10.39 K/UL — SIGNIFICANT CHANGE UP (ref 3.8–10.5)

## 2025-09-18 PROCEDURE — 99285 EMERGENCY DEPT VISIT HI MDM: CPT | Mod: FS

## 2025-09-18 PROCEDURE — 85025 COMPLETE CBC W/AUTO DIFF WBC: CPT

## 2025-09-18 PROCEDURE — 84484 ASSAY OF TROPONIN QUANT: CPT

## 2025-09-18 PROCEDURE — 72125 CT NECK SPINE W/O DYE: CPT | Mod: 26

## 2025-09-18 PROCEDURE — 74177 CT ABD & PELVIS W/CONTRAST: CPT

## 2025-09-18 PROCEDURE — 74177 CT ABD & PELVIS W/CONTRAST: CPT | Mod: 26

## 2025-09-18 PROCEDURE — 71045 X-RAY EXAM CHEST 1 VIEW: CPT

## 2025-09-18 PROCEDURE — 99285 EMERGENCY DEPT VISIT HI MDM: CPT | Mod: 25

## 2025-09-18 PROCEDURE — 83605 ASSAY OF LACTIC ACID: CPT

## 2025-09-18 PROCEDURE — 70250 X-RAY EXAM OF SKULL: CPT

## 2025-09-18 PROCEDURE — 36415 COLL VENOUS BLD VENIPUNCTURE: CPT

## 2025-09-18 PROCEDURE — 80053 COMPREHEN METABOLIC PANEL: CPT

## 2025-09-18 PROCEDURE — 85730 THROMBOPLASTIN TIME PARTIAL: CPT

## 2025-09-18 PROCEDURE — 93005 ELECTROCARDIOGRAM TRACING: CPT

## 2025-09-18 PROCEDURE — 71260 CT THORAX DX C+: CPT

## 2025-09-18 PROCEDURE — 70450 CT HEAD/BRAIN W/O DYE: CPT | Mod: 26

## 2025-09-18 PROCEDURE — 81003 URINALYSIS AUTO W/O SCOPE: CPT

## 2025-09-18 PROCEDURE — 85610 PROTHROMBIN TIME: CPT

## 2025-09-18 PROCEDURE — 36000 PLACE NEEDLE IN VEIN: CPT

## 2025-09-18 PROCEDURE — 87637 SARSCOV2&INF A&B&RSV AMP PRB: CPT

## 2025-09-18 PROCEDURE — 82550 ASSAY OF CK (CPK): CPT

## 2025-09-18 PROCEDURE — 74018 RADEX ABDOMEN 1 VIEW: CPT

## 2025-09-18 PROCEDURE — 93010 ELECTROCARDIOGRAM REPORT: CPT

## 2025-09-18 PROCEDURE — 70450 CT HEAD/BRAIN W/O DYE: CPT

## 2025-09-18 PROCEDURE — 71260 CT THORAX DX C+: CPT | Mod: 26

## 2025-09-18 PROCEDURE — 87040 BLOOD CULTURE FOR BACTERIA: CPT

## 2025-09-18 PROCEDURE — 72125 CT NECK SPINE W/O DYE: CPT

## 2025-09-19 PROBLEM — Z86.69 PERSONAL HISTORY OF OTHER DISEASES OF THE NERVOUS SYSTEM AND SENSE ORGANS: Chronic | Status: ACTIVE | Noted: 2025-09-18

## 2025-09-19 LAB
25(OH)D3 SERPL-MCNC: 19.6 NG/ML
ALBUMIN SERPL ELPH-MCNC: 4.4 G/DL
ALP BLD-CCNC: 59 U/L
ALT SERPL-CCNC: 13 U/L
ANION GAP SERPL CALC-SCNC: 14 MMOL/L
APPEARANCE: CLEAR
AST SERPL-CCNC: 13 U/L
BACTERIA: NEGATIVE /HPF
BILIRUB SERPL-MCNC: 1 MG/DL
BILIRUBIN URINE: NEGATIVE
BLOOD URINE: NEGATIVE
BUN SERPL-MCNC: 23 MG/DL
CALCIUM OXALATE CRYSTALS: PRESENT
CALCIUM SERPL-MCNC: 9.8 MG/DL
CAST: 1 /LPF
CHLORIDE SERPL-SCNC: 106 MMOL/L
CHOLEST SERPL-MCNC: 200 MG/DL
CO2 SERPL-SCNC: 22 MMOL/L
COLOR: YELLOW
CREAT SERPL-MCNC: 0.61 MG/DL
EGFRCR SERPLBLD CKD-EPI 2021: 99 ML/MIN/1.73M2
EPITHELIAL CELLS: 0 /HPF
ESTIMATED AVERAGE GLUCOSE: 108 MG/DL
GLUCOSE QUALITATIVE U: NEGATIVE MG/DL
GLUCOSE SERPL-MCNC: 112 MG/DL
HBA1C MFR BLD HPLC: 5.4 %
HCT VFR BLD CALC: 44.7 %
HDLC SERPL-MCNC: 63 MG/DL
HGB BLD-MCNC: 14.5 G/DL
KETONES URINE: ABNORMAL MG/DL
LDLC SERPL-MCNC: 126 MG/DL
LEUKOCYTE ESTERASE URINE: NEGATIVE
MCHC RBC-ENTMCNC: 31.7 PG
MCHC RBC-ENTMCNC: 32.4 G/DL
MCV RBC AUTO: 97.6 FL
MICROSCOPIC-UA: NORMAL
NITRITE URINE: NEGATIVE
NONHDLC SERPL-MCNC: 138 MG/DL
PH URINE: 5.5
PLATELET # BLD AUTO: 238 K/UL
POTASSIUM SERPL-SCNC: 4.2 MMOL/L
PROT SERPL-MCNC: 6.7 G/DL
PROTEIN URINE: NORMAL MG/DL
PSA SERPL-MCNC: 4.21 NG/ML
RBC # BLD: 4.58 M/UL
RBC # FLD: 12.6 %
RED BLOOD CELLS URINE: 2 /HPF
REVIEW: NORMAL
SODIUM SERPL-SCNC: 142 MMOL/L
SPECIFIC GRAVITY URINE: 1.02
T4 FREE SERPL-MCNC: 1.2 NG/DL
TRIGL SERPL-MCNC: 66 MG/DL
TSH SERPL-ACNC: 0.67 UIU/ML
UROBILINOGEN URINE: 0.2 MG/DL
WBC # FLD AUTO: 7.24 K/UL
WHITE BLOOD CELLS URINE: 0 /HPF

## 2025-09-23 LAB
CULTURE RESULTS: SIGNIFICANT CHANGE UP
CULTURE RESULTS: SIGNIFICANT CHANGE UP
SPECIMEN SOURCE: SIGNIFICANT CHANGE UP
SPECIMEN SOURCE: SIGNIFICANT CHANGE UP

## (undated) DEVICE — PACK UPPER BODY

## (undated) DEVICE — SOL IRR POUR NS 0.9% 1000ML

## (undated) DEVICE — SYR LUER LOK 50CC

## (undated) DEVICE — MARKING PEN W RULER

## (undated) DEVICE — SYR LUER LOK 3CC

## (undated) DEVICE — STAPLER SKIN PROXIMATE

## (undated) DEVICE — DRAPE TOWEL BLUE 17" X 24"

## (undated) DEVICE — STRYKER INSTRUMENT BATTERY

## (undated) DEVICE — SUT MONOCRYL 4-0 27" PS-2 UNDYED

## (undated) DEVICE — SPONGE SURGICAL STRIP 1/2 X 6"

## (undated) DEVICE — SUT NUROLON 4-0 8-18" TF (POP-OFF)

## (undated) DEVICE — GRID BRACHYTHERAPY EZ 18G

## (undated) DEVICE — CODMAN PERFORATOR 14MM (BLUE)

## (undated) DEVICE — MIDAS REX MR8 BALL FLUTED SM BORE 5MM X 10CM

## (undated) DEVICE — BIPOLAR FORCEP KOGENT IRRIGATING STRAIGHT 0.5MM X 7" DISP

## (undated) DEVICE — ADAPTER LUER STUB 15G

## (undated) DEVICE — SYR LUER LOK 5CC

## (undated) DEVICE — SYR CATH TIP 2 OZ

## (undated) DEVICE — GLV 8 PROTEXIS (WHITE)

## (undated) DEVICE — VENODYNE/SCD SLEEVE CALF MEDIUM

## (undated) DEVICE — PLASTIC SOLUTION BOWL 160Z

## (undated) DEVICE — DRAPE PROBE COVER 5" X 96"

## (undated) DEVICE — SUT VICRYL 3-0 18" SH (POP-OFF)

## (undated) DEVICE — NDL BIOPSY CHIBA 22G X 20CM

## (undated) DEVICE — SPONGE SURGICAL STRIP 1/4 X 6"

## (undated) DEVICE — PREP CHLORAPREP HI-LITE ORANGE 26ML

## (undated) DEVICE — APPLICATOR SKIN PREP CHG 3CC

## (undated) DEVICE — SUT VICRYL 0 18" CT-2 (POP-OFF)

## (undated) DEVICE — WARMING BLANKET FULL UNDERBODY

## (undated) DEVICE — DRSG TELFA 3 X 8

## (undated) DEVICE — SUT SILK 2-0 30" PSL

## (undated) DEVICE — DRAPE SPLIT SHEET 77" X 120"

## (undated) DEVICE — DRAPE MEDIUM SHEET 44" X 70"

## (undated) DEVICE — DRSG TEGADERM 4X4.75"

## (undated) DEVICE — MIDAS REX MR8 BALL FLUTED LG BORE 5MM X 9CM

## (undated) DEVICE — SUT VICRYL PLUS 2-0 18" CT-2 (POP-OFF)

## (undated) DEVICE — SUT SILK 2-0 12-18"

## (undated) DEVICE — NDL MAX CORE 18G X 25CM

## (undated) DEVICE — SYR ASEPTO

## (undated) DEVICE — TAPE SILK 3"

## (undated) DEVICE — GLV 7.5 PROTEXIS (WHITE)

## (undated) DEVICE — CRANIAL MASK TRACKER

## (undated) DEVICE — FRCP POLARIS NON-STICK BP 0.5MMX7IN DISP

## (undated) DEVICE — Device

## (undated) DEVICE — BALLOON ENDOCAVITY 2X14CM

## (undated) DEVICE — SYR LUER LOK 10CC

## (undated) DEVICE — NDL HYPO REGULAR BEVEL 25G X 1.5" (BLUE)

## (undated) DEVICE — SPONGE SURGICAL STRIP 1/8 X 6"

## (undated) DEVICE — DRAPE IRRIGATION POUCH 19X23"

## (undated) DEVICE — DRAPE INSTRUMENT POUCH 6.75" X 11"

## (undated) DEVICE — DRAPE IOBAN 23" X 23"

## (undated) DEVICE — DRSG MASTISOL

## (undated) DEVICE — WARMING BLANKET UPPER ADULT